# Patient Record
Sex: MALE | Race: WHITE | NOT HISPANIC OR LATINO | URBAN - METROPOLITAN AREA
[De-identification: names, ages, dates, MRNs, and addresses within clinical notes are randomized per-mention and may not be internally consistent; named-entity substitution may affect disease eponyms.]

---

## 2017-01-27 ENCOUNTER — EMERGENCY (EMERGENCY)
Facility: HOSPITAL | Age: 37
LOS: 0 days | Discharge: HOME | End: 2017-01-27

## 2017-06-27 DIAGNOSIS — Z72.0 TOBACCO USE: ICD-10-CM

## 2017-06-27 DIAGNOSIS — Z65.8 OTHER SPECIFIED PROBLEMS RELATED TO PSYCHOSOCIAL CIRCUMSTANCES: ICD-10-CM

## 2017-06-27 DIAGNOSIS — K02.9 DENTAL CARIES, UNSPECIFIED: ICD-10-CM

## 2017-06-27 DIAGNOSIS — K08.89 OTHER SPECIFIED DISORDERS OF TEETH AND SUPPORTING STRUCTURES: ICD-10-CM

## 2017-09-20 ENCOUNTER — EMERGENCY (EMERGENCY)
Facility: HOSPITAL | Age: 37
LOS: 0 days | Discharge: HOME | End: 2017-09-20

## 2017-09-20 DIAGNOSIS — L60.0 INGROWING NAIL: ICD-10-CM

## 2017-09-20 DIAGNOSIS — M79.674 PAIN IN RIGHT TOE(S): ICD-10-CM

## 2019-12-24 ENCOUNTER — INPATIENT (INPATIENT)
Facility: HOSPITAL | Age: 39
LOS: 3 days | Discharge: HOME | End: 2019-12-28
Attending: INTERNAL MEDICINE | Admitting: INTERNAL MEDICINE
Payer: COMMERCIAL

## 2019-12-24 VITALS
RESPIRATION RATE: 19 BRPM | HEART RATE: 121 BPM | DIASTOLIC BLOOD PRESSURE: 77 MMHG | TEMPERATURE: 105 F | SYSTOLIC BLOOD PRESSURE: 168 MMHG | OXYGEN SATURATION: 98 % | WEIGHT: 235.01 LBS

## 2019-12-24 LAB
ALBUMIN SERPL ELPH-MCNC: 4.4 G/DL — SIGNIFICANT CHANGE UP (ref 3.5–5.2)
ALP SERPL-CCNC: 70 U/L — SIGNIFICANT CHANGE UP (ref 30–115)
ALT FLD-CCNC: 42 U/L — HIGH (ref 0–41)
ANION GAP SERPL CALC-SCNC: 17 MMOL/L — HIGH (ref 7–14)
ANISOCYTOSIS BLD QL: SLIGHT — SIGNIFICANT CHANGE UP
APPEARANCE UR: CLEAR — SIGNIFICANT CHANGE UP
APTT BLD: 28.3 SEC — SIGNIFICANT CHANGE UP (ref 27–39.2)
AST SERPL-CCNC: 28 U/L — SIGNIFICANT CHANGE UP (ref 0–41)
BACTERIA # UR AUTO: ABNORMAL
BASE EXCESS BLDV CALC-SCNC: -1.9 MMOL/L — SIGNIFICANT CHANGE UP (ref -2–2)
BASOPHILS # BLD AUTO: 0 K/UL — SIGNIFICANT CHANGE UP (ref 0–0.2)
BASOPHILS NFR BLD AUTO: 0 % — SIGNIFICANT CHANGE UP (ref 0–1)
BILIRUB SERPL-MCNC: 2.4 MG/DL — HIGH (ref 0.2–1.2)
BILIRUB UR-MCNC: NEGATIVE — SIGNIFICANT CHANGE UP
BUN SERPL-MCNC: 17 MG/DL — SIGNIFICANT CHANGE UP (ref 10–20)
CA-I SERPL-SCNC: 1.2 MMOL/L — SIGNIFICANT CHANGE UP (ref 1.12–1.3)
CALCIUM SERPL-MCNC: 9.2 MG/DL — SIGNIFICANT CHANGE UP (ref 8.5–10.1)
CHLORIDE SERPL-SCNC: 102 MMOL/L — SIGNIFICANT CHANGE UP (ref 98–110)
CO2 SERPL-SCNC: 17 MMOL/L — SIGNIFICANT CHANGE UP (ref 17–32)
COLOR SPEC: YELLOW — SIGNIFICANT CHANGE UP
CREAT SERPL-MCNC: 1.1 MG/DL — SIGNIFICANT CHANGE UP (ref 0.7–1.5)
DIFF PNL FLD: ABNORMAL
EOSINOPHIL # BLD AUTO: 0.04 K/UL — SIGNIFICANT CHANGE UP (ref 0–0.7)
EOSINOPHIL NFR BLD AUTO: 1.7 % — SIGNIFICANT CHANGE UP (ref 0–8)
EPI CELLS # UR: 0 /HPF — SIGNIFICANT CHANGE UP (ref 0–5)
GAS PNL BLDV: 137 MMOL/L — SIGNIFICANT CHANGE UP (ref 136–145)
GAS PNL BLDV: SIGNIFICANT CHANGE UP
GIANT PLATELETS BLD QL SMEAR: PRESENT — SIGNIFICANT CHANGE UP
GLUCOSE SERPL-MCNC: 108 MG/DL — HIGH (ref 70–99)
GLUCOSE UR QL: NEGATIVE — SIGNIFICANT CHANGE UP
HCO3 BLDV-SCNC: 22 MMOL/L — SIGNIFICANT CHANGE UP (ref 22–29)
HCT VFR BLD CALC: 41.8 % — LOW (ref 42–52)
HCT VFR BLDA CALC: 42.8 % — SIGNIFICANT CHANGE UP (ref 34–44)
HGB BLD CALC-MCNC: 14 G/DL — SIGNIFICANT CHANGE UP (ref 14–18)
HGB BLD-MCNC: 13.9 G/DL — LOW (ref 14–18)
HYALINE CASTS # UR AUTO: 3 /LPF — SIGNIFICANT CHANGE UP (ref 0–7)
INR BLD: 1.47 RATIO — HIGH (ref 0.65–1.3)
KETONES UR-MCNC: NEGATIVE — SIGNIFICANT CHANGE UP
LACTATE BLDV-MCNC: 1.5 MMOL/L — SIGNIFICANT CHANGE UP (ref 0.5–1.6)
LACTATE SERPL-SCNC: 2.3 MMOL/L — HIGH (ref 0.7–2)
LEUKOCYTE ESTERASE UR-ACNC: ABNORMAL
LYMPHOCYTES # BLD AUTO: 0.3 K/UL — LOW (ref 1.2–3.4)
LYMPHOCYTES # BLD AUTO: 11.3 % — LOW (ref 20.5–51.1)
MANUAL SMEAR VERIFICATION: SIGNIFICANT CHANGE UP
MCHC RBC-ENTMCNC: 28.8 PG — SIGNIFICANT CHANGE UP (ref 27–31)
MCHC RBC-ENTMCNC: 33.3 G/DL — SIGNIFICANT CHANGE UP (ref 32–37)
MCV RBC AUTO: 86.5 FL — SIGNIFICANT CHANGE UP (ref 80–94)
METAMYELOCYTES # FLD: 0.9 % — HIGH (ref 0–0)
MICROCYTES BLD QL: SLIGHT — SIGNIFICANT CHANGE UP
MONOCYTES # BLD AUTO: 0.02 K/UL — LOW (ref 0.1–0.6)
MONOCYTES NFR BLD AUTO: 0.8 % — LOW (ref 1.7–9.3)
MYELOCYTES NFR BLD: 0.9 % — HIGH (ref 0–0)
NEUTROPHILS # BLD AUTO: 1.98 K/UL — SIGNIFICANT CHANGE UP (ref 1.4–6.5)
NEUTROPHILS NFR BLD AUTO: 72.2 % — SIGNIFICANT CHANGE UP (ref 42.2–75.2)
NEUTS BAND # BLD: 3.5 % — SIGNIFICANT CHANGE UP (ref 0–6)
NITRITE UR-MCNC: POSITIVE
PCO2 BLDV: 36 MMHG — LOW (ref 41–51)
PH BLDV: 7.4 — SIGNIFICANT CHANGE UP (ref 7.26–7.43)
PH UR: 8 — SIGNIFICANT CHANGE UP (ref 5–8)
PLAT MORPH BLD: NORMAL — SIGNIFICANT CHANGE UP
PLATELET # BLD AUTO: 148 K/UL — SIGNIFICANT CHANGE UP (ref 130–400)
PO2 BLDV: 46 MMHG — HIGH (ref 20–40)
POLYCHROMASIA BLD QL SMEAR: SLIGHT — SIGNIFICANT CHANGE UP
POTASSIUM BLDV-SCNC: 3.6 MMOL/L — SIGNIFICANT CHANGE UP (ref 3.3–5.6)
POTASSIUM SERPL-MCNC: 4.2 MMOL/L — SIGNIFICANT CHANGE UP (ref 3.5–5)
POTASSIUM SERPL-SCNC: 4.2 MMOL/L — SIGNIFICANT CHANGE UP (ref 3.5–5)
PROT SERPL-MCNC: 6.9 G/DL — SIGNIFICANT CHANGE UP (ref 6–8)
PROT UR-MCNC: ABNORMAL
PROTHROM AB SERPL-ACNC: 16.8 SEC — HIGH (ref 9.95–12.87)
RBC # BLD: 4.83 M/UL — SIGNIFICANT CHANGE UP (ref 4.7–6.1)
RBC # FLD: 12.2 % — SIGNIFICANT CHANGE UP (ref 11.5–14.5)
RBC BLD AUTO: ABNORMAL
RBC CASTS # UR COMP ASSIST: 26 /HPF — HIGH (ref 0–4)
SAO2 % BLDV: 84 % — SIGNIFICANT CHANGE UP
SODIUM SERPL-SCNC: 136 MMOL/L — SIGNIFICANT CHANGE UP (ref 135–146)
SP GR SPEC: 1.02 — SIGNIFICANT CHANGE UP (ref 1.01–1.02)
UROBILINOGEN FLD QL: SIGNIFICANT CHANGE UP
VARIANT LYMPHS # BLD: 8.7 % — HIGH (ref 0–5)
WBC # BLD: 2.62 K/UL — LOW (ref 4.8–10.8)
WBC # FLD AUTO: 2.62 K/UL — LOW (ref 4.8–10.8)
WBC UR QL: 26 /HPF — HIGH (ref 0–5)

## 2019-12-24 PROCEDURE — 99285 EMERGENCY DEPT VISIT HI MDM: CPT

## 2019-12-24 PROCEDURE — 74177 CT ABD & PELVIS W/CONTRAST: CPT | Mod: 26

## 2019-12-24 PROCEDURE — 93010 ELECTROCARDIOGRAM REPORT: CPT

## 2019-12-24 PROCEDURE — 71046 X-RAY EXAM CHEST 2 VIEWS: CPT | Mod: 26

## 2019-12-24 RX ORDER — ONDANSETRON 8 MG/1
4 TABLET, FILM COATED ORAL ONCE
Refills: 0 | Status: COMPLETED | OUTPATIENT
Start: 2019-12-24 | End: 2019-12-24

## 2019-12-24 RX ORDER — FAMOTIDINE 10 MG/ML
20 INJECTION INTRAVENOUS ONCE
Refills: 0 | Status: COMPLETED | OUTPATIENT
Start: 2019-12-24 | End: 2019-12-24

## 2019-12-24 RX ORDER — SODIUM CHLORIDE 9 MG/ML
1000 INJECTION INTRAMUSCULAR; INTRAVENOUS; SUBCUTANEOUS ONCE
Refills: 0 | Status: COMPLETED | OUTPATIENT
Start: 2019-12-24 | End: 2019-12-24

## 2019-12-24 RX ORDER — CEFTRIAXONE 500 MG/1
1000 INJECTION, POWDER, FOR SOLUTION INTRAMUSCULAR; INTRAVENOUS ONCE
Refills: 0 | Status: COMPLETED | OUTPATIENT
Start: 2019-12-24 | End: 2019-12-24

## 2019-12-24 RX ORDER — METRONIDAZOLE 500 MG
500 TABLET ORAL ONCE
Refills: 0 | Status: COMPLETED | OUTPATIENT
Start: 2019-12-24 | End: 2019-12-24

## 2019-12-24 RX ORDER — SODIUM CHLORIDE 9 MG/ML
3300 INJECTION INTRAMUSCULAR; INTRAVENOUS; SUBCUTANEOUS ONCE
Refills: 0 | Status: COMPLETED | OUTPATIENT
Start: 2019-12-24 | End: 2019-12-24

## 2019-12-24 RX ORDER — ACETAMINOPHEN 500 MG
975 TABLET ORAL ONCE
Refills: 0 | Status: COMPLETED | OUTPATIENT
Start: 2019-12-24 | End: 2019-12-24

## 2019-12-24 RX ADMIN — FAMOTIDINE 20 MILLIGRAM(S): 10 INJECTION INTRAVENOUS at 20:14

## 2019-12-24 RX ADMIN — SODIUM CHLORIDE 2000 MILLILITER(S): 9 INJECTION INTRAMUSCULAR; INTRAVENOUS; SUBCUTANEOUS at 23:54

## 2019-12-24 RX ADMIN — Medication 100 MILLIGRAM(S): at 23:37

## 2019-12-24 RX ADMIN — CEFTRIAXONE 100 MILLIGRAM(S): 500 INJECTION, POWDER, FOR SOLUTION INTRAMUSCULAR; INTRAVENOUS at 21:01

## 2019-12-24 RX ADMIN — Medication 975 MILLIGRAM(S): at 18:51

## 2019-12-24 RX ADMIN — ONDANSETRON 4 MILLIGRAM(S): 8 TABLET, FILM COATED ORAL at 20:13

## 2019-12-24 RX ADMIN — SODIUM CHLORIDE 3300 MILLILITER(S): 9 INJECTION INTRAMUSCULAR; INTRAVENOUS; SUBCUTANEOUS at 20:14

## 2019-12-24 NOTE — ED PROVIDER NOTE - OBJECTIVE STATEMENT
40 y/o M without PMH presents with fever tmax 104F, n/v/d x yesterday. no pain. 3 episodes of non-bloody non-bilious vomiting, and approximately 20 episodes of non-bloody diarrhea in last 24 hrs. no palliating/provoking factors, but thinks he ate bad chicken prior to onset. no sick contacts. [place] and has heard that there may have been some people vomiting there, but is not sure.   also relates some dysuria since last night without hematuria/frequency/urgency/flank pain/testicular pain.   no pshx.

## 2019-12-24 NOTE — ED PROVIDER NOTE - NS ED ROS FT
Review of Systems    Constitutional: (+) fever   Eyes/ENT: (-) vision changes   Cardiovascular: (-) chest pain, (-) syncope (-) palpitations  Respiratory: (-) cough, (-) shortness of breath  Gastrointestinal: (+) vomiting, (+) diarrhea (-)black/bloody stools (-) abdominal pain  Genitourinary:  (+) dysuria   Musculoskeletal: (-) neck pain, (-) back pain, (-) leg pain/swelling  Integumentary: (-) rash, (-) edema  Neurological: (-) headache, (-) confusion  Hematologic: (-) easy bruising   Allergic/Immunologic: (-) pruritus

## 2019-12-24 NOTE — ED PROVIDER NOTE - CLINICAL SUMMARY MEDICAL DECISION MAKING FREE TEXT BOX
Patient remained hemodynamically stable in ED, improved well, labs/CT findings noted, IVF/IV abx given, is admitted to Medicine for further care.

## 2019-12-24 NOTE — ED PROVIDER NOTE - PHYSICAL EXAMINATION
PHYSICAL EXAM:    GENERAL: Alert, appears stated age, well appearing, non-toxic  SKIN: Warm, pink and dry. MM somewhat dry. +feels warm  EYE: Normal lids/conjunctiva  ENT: Normal hearing, patent oropharynx  NECK: +supple. No meningismus, or JVD  Pulm: Bilateral BS, normal resp effort, no wheezes, stridor, or retractions  CV: RRR, no M/R/G, 2+and = radial pulses  Abd: soft, non-tender, non-distended. no rebound/guarding. no CVA tenderness.   Mskel: no erythema, cyanosis, edema. no calf tenderness  Neuro: AAOx3, normal gait.

## 2019-12-24 NOTE — ED PROVIDER NOTE - ATTENDING CONTRIBUTION TO CARE
Patient is c/o one day h/o fever, chills, diarrhea, dysuria, +nausea, vomited x 2, denies nasal congestion, no cough, no rash, +lower abd cramping, no recent abx use. NO rash.   vitals noted  lungs: CTA, no crackles  abd: +BS, +mild lower abd tenderness, ND, soft  CNS: awake, alert, o x 3, no meningeal signs noted  skin: no rash  A/P: Fever/diarrhea/vomiting  dysuria  labs, IVF, tylenol, abx  CT, reevaluation

## 2019-12-25 LAB
ALBUMIN SERPL ELPH-MCNC: 3.6 G/DL — SIGNIFICANT CHANGE UP (ref 3.5–5.2)
ALP SERPL-CCNC: 60 U/L — SIGNIFICANT CHANGE UP (ref 30–115)
ALT FLD-CCNC: 37 U/L — SIGNIFICANT CHANGE UP (ref 0–41)
ANION GAP SERPL CALC-SCNC: 13 MMOL/L — SIGNIFICANT CHANGE UP (ref 7–14)
AST SERPL-CCNC: 25 U/L — SIGNIFICANT CHANGE UP (ref 0–41)
BILIRUB SERPL-MCNC: 2.1 MG/DL — HIGH (ref 0.2–1.2)
BUN SERPL-MCNC: 12 MG/DL — SIGNIFICANT CHANGE UP (ref 10–20)
CALCIUM SERPL-MCNC: 8.1 MG/DL — LOW (ref 8.5–10.1)
CHLORIDE SERPL-SCNC: 107 MMOL/L — SIGNIFICANT CHANGE UP (ref 98–110)
CO2 SERPL-SCNC: 20 MMOL/L — SIGNIFICANT CHANGE UP (ref 17–32)
CREAT SERPL-MCNC: 1 MG/DL — SIGNIFICANT CHANGE UP (ref 0.7–1.5)
CULTURE RESULTS: SIGNIFICANT CHANGE UP
E COLI DNA BLD POS QL NAA+NON-PROBE: SIGNIFICANT CHANGE UP
GLUCOSE BLDC GLUCOMTR-MCNC: 77 MG/DL — SIGNIFICANT CHANGE UP (ref 70–99)
GLUCOSE SERPL-MCNC: 129 MG/DL — HIGH (ref 70–99)
GRAM STN FLD: SIGNIFICANT CHANGE UP
HCT VFR BLD CALC: 38.3 % — LOW (ref 42–52)
HGB BLD-MCNC: 12.3 G/DL — LOW (ref 14–18)
LACTATE SERPL-SCNC: 1.6 MMOL/L — SIGNIFICANT CHANGE UP (ref 0.7–2)
MAGNESIUM SERPL-MCNC: 1.4 MG/DL — LOW (ref 1.8–2.4)
MCHC RBC-ENTMCNC: 28.7 PG — SIGNIFICANT CHANGE UP (ref 27–31)
MCHC RBC-ENTMCNC: 32.1 G/DL — SIGNIFICANT CHANGE UP (ref 32–37)
MCV RBC AUTO: 89.3 FL — SIGNIFICANT CHANGE UP (ref 80–94)
METHOD TYPE: SIGNIFICANT CHANGE UP
NRBC # BLD: 0 /100 WBCS — SIGNIFICANT CHANGE UP (ref 0–0)
PLATELET # BLD AUTO: 133 K/UL — SIGNIFICANT CHANGE UP (ref 130–400)
POTASSIUM SERPL-MCNC: 4.5 MMOL/L — SIGNIFICANT CHANGE UP (ref 3.5–5)
POTASSIUM SERPL-SCNC: 4.5 MMOL/L — SIGNIFICANT CHANGE UP (ref 3.5–5)
PROT SERPL-MCNC: 5.7 G/DL — LOW (ref 6–8)
RBC # BLD: 4.29 M/UL — LOW (ref 4.7–6.1)
RBC # FLD: 12.8 % — SIGNIFICANT CHANGE UP (ref 11.5–14.5)
SODIUM SERPL-SCNC: 140 MMOL/L — SIGNIFICANT CHANGE UP (ref 135–146)
SPECIMEN SOURCE: SIGNIFICANT CHANGE UP
WBC # BLD: 12.59 K/UL — HIGH (ref 4.8–10.8)
WBC # FLD AUTO: 12.59 K/UL — HIGH (ref 4.8–10.8)

## 2019-12-25 PROCEDURE — 99223 1ST HOSP IP/OBS HIGH 75: CPT

## 2019-12-25 RX ORDER — ENOXAPARIN SODIUM 100 MG/ML
40 INJECTION SUBCUTANEOUS DAILY
Refills: 0 | Status: DISCONTINUED | OUTPATIENT
Start: 2019-12-25 | End: 2019-12-28

## 2019-12-25 RX ORDER — MAGNESIUM SULFATE 500 MG/ML
2 VIAL (ML) INJECTION ONCE
Refills: 0 | Status: COMPLETED | OUTPATIENT
Start: 2019-12-25 | End: 2019-12-25

## 2019-12-25 RX ORDER — METRONIDAZOLE 500 MG
500 TABLET ORAL EVERY 8 HOURS
Refills: 0 | Status: DISCONTINUED | OUTPATIENT
Start: 2019-12-25 | End: 2019-12-25

## 2019-12-25 RX ORDER — INFLUENZA VIRUS VACCINE 15; 15; 15; 15 UG/.5ML; UG/.5ML; UG/.5ML; UG/.5ML
0.5 SUSPENSION INTRAMUSCULAR ONCE
Refills: 0 | Status: DISCONTINUED | OUTPATIENT
Start: 2019-12-25 | End: 2019-12-28

## 2019-12-25 RX ORDER — SODIUM CHLORIDE 9 MG/ML
1000 INJECTION INTRAMUSCULAR; INTRAVENOUS; SUBCUTANEOUS
Refills: 0 | Status: DISCONTINUED | OUTPATIENT
Start: 2019-12-25 | End: 2019-12-27

## 2019-12-25 RX ORDER — CEFTRIAXONE 500 MG/1
2000 INJECTION, POWDER, FOR SOLUTION INTRAMUSCULAR; INTRAVENOUS EVERY 24 HOURS
Refills: 0 | Status: DISCONTINUED | OUTPATIENT
Start: 2019-12-26 | End: 2019-12-28

## 2019-12-25 RX ORDER — CIPROFLOXACIN LACTATE 400MG/40ML
200 VIAL (ML) INTRAVENOUS EVERY 12 HOURS
Refills: 0 | Status: DISCONTINUED | OUTPATIENT
Start: 2019-12-25 | End: 2019-12-25

## 2019-12-25 RX ORDER — CEFTRIAXONE 500 MG/1
INJECTION, POWDER, FOR SOLUTION INTRAMUSCULAR; INTRAVENOUS
Refills: 0 | Status: DISCONTINUED | OUTPATIENT
Start: 2019-12-25 | End: 2019-12-28

## 2019-12-25 RX ORDER — CEFTRIAXONE 500 MG/1
2000 INJECTION, POWDER, FOR SOLUTION INTRAMUSCULAR; INTRAVENOUS ONCE
Refills: 0 | Status: COMPLETED | OUTPATIENT
Start: 2019-12-25 | End: 2019-12-25

## 2019-12-25 RX ADMIN — Medication 100 MILLIGRAM(S): at 05:23

## 2019-12-25 RX ADMIN — CEFTRIAXONE 100 MILLIGRAM(S): 500 INJECTION, POWDER, FOR SOLUTION INTRAMUSCULAR; INTRAVENOUS at 14:43

## 2019-12-25 RX ADMIN — Medication 12.5 GRAM(S): at 21:50

## 2019-12-25 NOTE — H&P ADULT - HISTORY OF PRESENT ILLNESS
38 y/o M without PMH presents with fever tmax 104F, n/v/d x yesterday. no pain. 3 episodes of non-bloody non-bilious vomiting, and approximately 20 episodes of non-bloody diarrhea in last 24 hrs. no palliating/provoking factors, but thinks he ate bad chicken prior to onset. no sick contacts. [place] and has heard that there may have been some people vomiting there, but is not sure.   	also relates some dysuria since last night without hematuria/frequency/urgency/flank pain/testicular pain. Patient is a 38 y/o M with no PMH presented to the hospital with chief complaint of fever, diarrhea since yesterday. Patient travelled to a resort in [place] with his family and came back yesterday. After returning, he started having multiple episodes of nonbloody watery diarrhea, about 20 episodes, with no associated abdominal pain. Patient also reports having subjective fevers at home, 3 episodes of nonbloody, nonbilious vomiting. Family also had food at the same resort but none of them are sick. He also reports having burning on urination since yesterday with no increased frequency, flank tenderness, hematuria.   In ED, /77mm Hg, , 104.6F. Labs on admission showed WBC 2.62, lactate 2.3. EKG showed sinus tachycardia. UA positive. CT abdomen showed no intraabdominal pathology. Diarrhea currently resolved.

## 2019-12-25 NOTE — H&P ADULT - ASSESSMENT
Patient is a 40 y/o M with no PMH presented to the hospital with 1 day history of fever, multiple episodes of nonbloody diarrhea, 3 episodes of non bloody vomiting. Patient travelled to a resort in [place] with his family and came back yesterday. In ED, vitals showed , fever of 104.6F. Labs on admission showed WBC 2.62, lactate 2.3. EKG showed sinus tachycardia. UA positive. CT abdomen showed no intraabdominal pathology. Diarrhea currently resolved.     ASSESSMENT AND PLAN:    #Sepsis 2/2 viral gastroenteritis vs UTI  -Multiple episodes of diarrhea, 3 episodes of vomiting, fever, dysuria  -WBC 2.62, lactate 2.3, tachycardic to 121, fever 104.6F on admission  -Currently diarrhea and vomiting resolved, afebrile  -s/p 3l LR bolus. Started on NS @75cc/hr  -UA positive  -CT abdomen & pelvis showed no acute intra abdominal pathology  -Started on cipro and flagyl   -f/u urine and blood culture results, repeat lactate in am    #Diet: Regular  #DVT ppx: lovenox  #GI ppx: not indicated  #Dispo: acute Patient is a 40 y/o M with no PMH presented to the hospital with 1 day history of fever, multiple episodes of nonbloody diarrhea, 3 episodes of non bloody vomiting. Patient travelled to a resort in [place] with his family and came back yesterday. In ED, vitals showed , fever of 104.6F. Labs on admission showed WBC 2.62, lactate 2.3. EKG showed sinus tachycardia. UA positive. CT abdomen showed no intraabdominal pathology. Diarrhea currently resolved.     ASSESSMENT AND PLAN:    #Sepsis 2/2 viral gastroenteritis vs UTI  -Multiple episodes of diarrhea, 3 episodes of vomiting, fever, dysuria  -WBC 2.62, lactate 2.3, tachycardic to 121, fever 104.6F on admission  -Currently diarrhea and vomiting resolved, afebrile  -s/p 3l LR bolus. Started on NS @75cc/hr  -UA positive  -CT abdomen & pelvis showed no acute intra abdominal pathology  - Given cipro and flagyl in the ER.  -f/u urine and blood culture results.    #Diet: Regular  #DVT ppx: lovenox  #GI ppx: not indicated  #Dispo: acute

## 2019-12-25 NOTE — H&P ADULT - ATTENDING COMMENTS
The patient was seen and examined independently.   Agree with above a/p by resident except the portion edited/corrected.     The patient found to be bacteremic with GNR.  started on IV abx Ceftriaxone.  Will f/w blood cx / urine cx.    Plan d/w the patient in details at bedside.  Will follow.

## 2019-12-26 LAB
ALBUMIN SERPL ELPH-MCNC: 3.6 G/DL — SIGNIFICANT CHANGE UP (ref 3.5–5.2)
ALP SERPL-CCNC: 60 U/L — SIGNIFICANT CHANGE UP (ref 30–115)
ALT FLD-CCNC: 31 U/L — SIGNIFICANT CHANGE UP (ref 0–41)
ANION GAP SERPL CALC-SCNC: 14 MMOL/L — SIGNIFICANT CHANGE UP (ref 7–14)
AST SERPL-CCNC: 18 U/L — SIGNIFICANT CHANGE UP (ref 0–41)
BASOPHILS # BLD AUTO: 0.05 K/UL — SIGNIFICANT CHANGE UP (ref 0–0.2)
BASOPHILS NFR BLD AUTO: 0.5 % — SIGNIFICANT CHANGE UP (ref 0–1)
BILIRUB SERPL-MCNC: 0.6 MG/DL — SIGNIFICANT CHANGE UP (ref 0.2–1.2)
BUN SERPL-MCNC: 11 MG/DL — SIGNIFICANT CHANGE UP (ref 10–20)
CALCIUM SERPL-MCNC: 8.5 MG/DL — SIGNIFICANT CHANGE UP (ref 8.5–10.1)
CHLORIDE SERPL-SCNC: 105 MMOL/L — SIGNIFICANT CHANGE UP (ref 98–110)
CO2 SERPL-SCNC: 21 MMOL/L — SIGNIFICANT CHANGE UP (ref 17–32)
CREAT SERPL-MCNC: 1 MG/DL — SIGNIFICANT CHANGE UP (ref 0.7–1.5)
EOSINOPHIL # BLD AUTO: 0.2 K/UL — SIGNIFICANT CHANGE UP (ref 0–0.7)
EOSINOPHIL NFR BLD AUTO: 1.8 % — SIGNIFICANT CHANGE UP (ref 0–8)
GLUCOSE SERPL-MCNC: 103 MG/DL — HIGH (ref 70–99)
GRAM STN FLD: SIGNIFICANT CHANGE UP
HCT VFR BLD CALC: 38.4 % — LOW (ref 42–52)
HGB BLD-MCNC: 12.3 G/DL — LOW (ref 14–18)
IMM GRANULOCYTES NFR BLD AUTO: 0.5 % — HIGH (ref 0.1–0.3)
LYMPHOCYTES # BLD AUTO: 1.64 K/UL — SIGNIFICANT CHANGE UP (ref 1.2–3.4)
LYMPHOCYTES # BLD AUTO: 14.8 % — LOW (ref 20.5–51.1)
MAGNESIUM SERPL-MCNC: 2.4 MG/DL — SIGNIFICANT CHANGE UP (ref 1.8–2.4)
MCHC RBC-ENTMCNC: 28.7 PG — SIGNIFICANT CHANGE UP (ref 27–31)
MCHC RBC-ENTMCNC: 32 G/DL — SIGNIFICANT CHANGE UP (ref 32–37)
MCV RBC AUTO: 89.7 FL — SIGNIFICANT CHANGE UP (ref 80–94)
MONOCYTES # BLD AUTO: 0.67 K/UL — HIGH (ref 0.1–0.6)
MONOCYTES NFR BLD AUTO: 6 % — SIGNIFICANT CHANGE UP (ref 1.7–9.3)
NEUTROPHILS # BLD AUTO: 8.46 K/UL — HIGH (ref 1.4–6.5)
NEUTROPHILS NFR BLD AUTO: 76.4 % — HIGH (ref 42.2–75.2)
NRBC # BLD: 0 /100 WBCS — SIGNIFICANT CHANGE UP (ref 0–0)
PLATELET # BLD AUTO: 137 K/UL — SIGNIFICANT CHANGE UP (ref 130–400)
POTASSIUM SERPL-MCNC: 4.2 MMOL/L — SIGNIFICANT CHANGE UP (ref 3.5–5)
POTASSIUM SERPL-SCNC: 4.2 MMOL/L — SIGNIFICANT CHANGE UP (ref 3.5–5)
PROT SERPL-MCNC: 6.2 G/DL — SIGNIFICANT CHANGE UP (ref 6–8)
RBC # BLD: 4.28 M/UL — LOW (ref 4.7–6.1)
RBC # FLD: 13 % — SIGNIFICANT CHANGE UP (ref 11.5–14.5)
SODIUM SERPL-SCNC: 140 MMOL/L — SIGNIFICANT CHANGE UP (ref 135–146)
WBC # BLD: 11.08 K/UL — HIGH (ref 4.8–10.8)
WBC # FLD AUTO: 11.08 K/UL — HIGH (ref 4.8–10.8)

## 2019-12-26 PROCEDURE — 99233 SBSQ HOSP IP/OBS HIGH 50: CPT

## 2019-12-26 RX ORDER — CHLORHEXIDINE GLUCONATE 213 G/1000ML
1 SOLUTION TOPICAL
Refills: 0 | Status: DISCONTINUED | OUTPATIENT
Start: 2019-12-26 | End: 2019-12-28

## 2019-12-26 RX ADMIN — CEFTRIAXONE 100 MILLIGRAM(S): 500 INJECTION, POWDER, FOR SOLUTION INTRAMUSCULAR; INTRAVENOUS at 12:23

## 2019-12-26 RX ADMIN — ENOXAPARIN SODIUM 40 MILLIGRAM(S): 100 INJECTION SUBCUTANEOUS at 12:23

## 2019-12-26 NOTE — PROGRESS NOTE ADULT - ASSESSMENT
Patient is a 38 y/o M with no PMH presented to the hospital with 1 day history of fever, multiple episodes of nonbloody diarrhea, 3 episodes of non bloody vomiting. Patient travelled to a resort in [place] with his family and came back yesterday. In ED, vitals showed , fever of 104.6F. Labs on admission showed WBC 2.62, lactate 2.3. EKG showed sinus tachycardia. UA positive. CT abdomen showed no intraabdominal pathology.     ASSESSMENT AND PLAN:    #Sepsis with gram negative luz bacteremia   - Multiple episodes of diarrhea, 3 episodes of vomiting, fever, dysuria  - WBC 2.62, lactate 2.3, tachycardic to 121, fever 104.6F on admission  - Currently diarrhea has improved and vomiting resolved, afebrile  - s/p 3l LR bolus. Continuing NS @75cc/hr  - UA positive  - Urine culture revealed normal gracie  -CT abdomen & pelvis showed no acute intra abdominal pathology  - Given cipro and flagyl in the ER.  - Currently on Ceftriaxone 2000mg IV   -Blood cx- 2/2 growing gram negative rods in aerobic bottles, one growing E Coli    Possible gastroenteritis  -Stool PCR detected EPEC    #Diet: Regular  #DVT ppx: lovenox  #GI ppx: not indicated  #Dispo: acute    #Progress Note Handoff  Pending (specify): Final  Blood cx / Stool cx   Family discussion: NA, d/w the patient at bedside.   Disposition: Home.

## 2019-12-26 NOTE — CONSULT NOTE ADULT - SUBJECTIVE AND OBJECTIVE BOX
HPI:     Patient is a 40 y/o M with no PMH presented to the hospital with chief complaint of fevers, diarrhea since yesterday. Patient travelled to a resort in [place] with his family and came back yesterday. The he started having multiple episodes of nonbloody watery diarrhea,  fevers at home, onbloody, nonbilious vomiting and chills.     In ED, /77mm Hg, , 104.6F.     No Known Allergies      Awake, alert, s1s2, abd soft, non tender, lungs clear,   no edema  Pt saying BM getting formed    cefTRIAXone   IVPB      cefTRIAXone   IVPB 2000 milliGRAM(s) IV Intermittent every 24 hours  chlorhexidine 4% Liquid 1 Application(s) Topical <User Schedule>  enoxaparin Injectable 40 milliGRAM(s) SubCutaneous daily  influenza   Vaccine 0.5 milliLiter(s) IntraMuscular once  sodium chloride 0.9%. 1000 milliLiter(s) IV Continuous <Continuous>    cefTRIAXone   IVPB      cefTRIAXone   IVPB 2000 milliGRAM(s) IV Intermittent every 24 hours      T(C): 37.3 (19 @ 13:30), Max: 37.4 (19 @ 21:32)  HR: 84 (19 @ 13:30) (84 - 95)  BP: 116/66 (19 @ 13:30) (104/50 - 130/78)  RR: 18 (19 @ 13:30) (18 - 18)  SpO2: 96% (19 @ 09:25) (96% - 96%)                            12.3   11.08 )-----------( 137      ( 26 Dec 2019 06:44 )             38.4           140  |  105  |  11  ----------------------------<  103<H>  4.2   |  21  |  1.0    Ca    8.5      26 Dec 2019 06:44  Mg     2.4         TPro  6.2  /  Alb  3.6  /  TBili  0.6  /  DBili  x   /  AST  18  /  ALT  31  /  AlkPhos  60            Urinalysis Basic - ( 24 Dec 2019 20:44 )    Color: Yellow / Appearance: Clear / S.021 / pH: x  Gluc: x / Ketone: Negative  / Bili: Negative / Urobili: <2 mg/dL   Blood: x / Protein: 30 mg/dL / Nitrite: Positive   Leuk Esterase: Moderate / RBC: 26 /HPF / WBC 26 /HPF   Sq Epi: x / Non Sq Epi: 0 /HPF / Bacteria: Many    GI PCR Panel, Stool (19 @ 12:21)    Specimen Source: .Stool Feces    Culture Results:   Enteropathogenic E. coli (EPEC)  DETECTED by PCR  *******Please Note:*******  GI panel PCR evaluates for:  Campylobacter, Plesiomonas shigelloides, Salmonella,  Vibrio, Yersinia enterocolitica, Enteroaggregative  Escherichia coli (EAEC), Enteropathogenic E.coli (EPEC),  Enterotoxigenic E. coli (ETEC) lt/st, Shiga-like  toxin-producing E. coli (STEC) stx1/stx2,  Shigella/ Enteroinvasive E. coli (EIEC), Cryptosporidium,  Cyclospora cayetanensis, Entamoeba histolytica,  Giardia lamblia, AdenovirusF 40/41, Astrovirus,  Norovirus GI/GII, Rotavirus A, Sapovirus    Culture - Urine (19 @ 00:20)    Specimen Source: .Urine None    Culture Results:   <10,000 CFU/mL Normal Urogenital Jennifer    Culture - Blood (19 @ 19:29)    Gram Stain:   Growth in aerobic bottle: Gram Negative Rods  Growth in anaerobic bottle: Gram Negative Rods    Specimen Source: .Blood Blood-Peripheral    Culture Results:   Growth in aerobic bottle: Escherichia coli See previous culture  [id number]  Growth in anaerobic bottle: Gram Negative Rods    Culture - Blood (19 @ 19:29)    -  Escherichia coli: Detec    Gram Stain:   Growth in aerobic bottle: Gram Negative Rods  Growth in anaerobic bottle: Gram Negative Rods    Specimen Source: .Blood Blood-Peripheral    Organism: Blood Culture PCR    Culture Results:   Growth in aerobic and anaerobic bottles: Escherichia coli  "Due to technical problems, Proteus sp. will Not be reported as part of  the BCID panel until further notice"  ***Blood Panel PCR results on this specimen are available  approximately 3 hours after the Gram stain result.***  Gram stain, PCR, and/or culture results may not always  correspond due to difference in methodologies.  ************************************************************  This PCR assay was performed using [employer].  The following targets are tested for: Enterococcus,  vancomycin resistant enterococci, Listeria monocytogenes,  coagulase negative staphylococci, S. aureus,  methicillin resistant S. aureus, Streptococcus agalactiae  (Group B), S. pneumoniae, S.pyogenes (Group A),  Acinetobacter baumannii, Enterobacter cloacae, E. coli,  Klebsiella oxytoca, K. pneumoniae, Proteus sp.,  Serratia marcescens, Haemophilus influenzae,  Neisseria meningitidis, Pseudomonas aeruginosa, Candida  albicans, C. glabrata, C krusei, C parapsilosis,  C. tropicalis and the KPC resistance gene.    Organism Identification: Blood Culture PCR    Method Type: PCR    EXAM:  CT ABDOMEN AND PELVIS IC            PROCEDURE DATE:  2019            INTERPRETATION:  CLINICAL STATEMENT: Diarrhea, fever.      TECHNIQUE: Contiguous axial CT images were obtained from the lower chest to the pubic symphysis following administration of 100cc Optiray 320 intravenous contrast.  Oral contrast was not administered.  Reformatted images in the coronal and sagittal planes were acquired.    COMPARISON CT: None.    FINDINGS:    LOWER CHEST: Unremarkable.    HEPATOBILIARY: Unremarkable.    SPLEEN: Unremarkable.    PANCREAS: Unremarkable.    ADRENAL GLANDS: Unremarkable.    KIDNEYS: Symmetric nephrograms. No hydronephrosis.     ABDOMINOPELVIC NODES: No abdominopelvic lymphadenopathy.    PELVIC ORGANS: Mildly enlarged prostate gland.    PERITONEUM/MESENTERY/BOWEL: No evidence of bowel obstruction, pneumoperitoneum, or ascites. Normal caliber appendix.    BONES/SOFT TISSUES: No acute osseous abnormality.     OTHER: Normal caliber aorta.      IMPRESSION:     No CT evidence for acute intra-abdominal or pelvic pathology

## 2019-12-26 NOTE — PROGRESS NOTE ADULT - ASSESSMENT
Patient is a 38 y/o M with no PMH presented to the hospital with 1 day history of fever, multiple episodes of nonbloody diarrhea, 3 episodes of non bloody vomiting. Patient travelled to a resort in [place] with his family and came back yesterday. In ED, vitals showed , fever of 104.6F. Labs on admission showed WBC 2.62, lactate 2.3. EKG showed sinus tachycardia. UA positive. CT abdomen showed no intraabdominal pathology. Diarrhea currently resolved.     ASSESSMENT AND PLAN:    #Sepsis with gram negative luz bacteremia 2/2 UTI  -Multiple episodes of diarrhea, 3 episodes of vomiting, fever, dysuria  -WBC 2.62, lactate 2.3, tachycardic to 121, fever 104.6F on admission  -Currently diarrhea and vomiting resolved, afebrile  -s/p 3l LR bolus. Started on NS @75cc/hr  -UA positive  -CT abdomen & pelvis showed no acute intra abdominal pathology  - Given cipro and flagyl in the ER.  -f/u urine   -blood cx- 2/2 growing gram negative rods in aerobic bottles, one growing E Coli    Possible gastroenteritis  -stool cx grow EPEC    #Diet: Regular  #DVT ppx: lovenox  #GI ppx: not indicated  #Dispo: acute Patient is a 40 y/o M with no PMH presented to the hospital with 1 day history of fever, multiple episodes of nonbloody diarrhea, 3 episodes of non bloody vomiting. Patient travelled to a resort in [place] with his family and came back yesterday. In ED, vitals showed , fever of 104.6F. Labs on admission showed WBC 2.62, lactate 2.3. EKG showed sinus tachycardia. UA positive. CT abdomen showed no intraabdominal pathology.     ASSESSMENT AND PLAN:    #Sepsis with gram negative luz bacteremia   - Multiple episodes of diarrhea, 3 episodes of vomiting, fever, dysuria  - WBC 2.62, lactate 2.3, tachycardic to 121, fever 104.6F on admission  - Currently diarrhea has improved and vomiting resolved, afebrile  - s/p 3l LR bolus. Continuing NS @75cc/hr  - UA positive  - Urine culture revealed normal gracie  -CT abdomen & pelvis showed no acute intra abdominal pathology  - Given cipro and flagyl in the ER.  - Currently on Ceftriaxone 2000mg IV   -Blood cx- 2/2 growing gram negative rods in aerobic bottles, one growing E Coli    Possible gastroenteritis  -Stool PCR detected EPEC    #Diet: Regular  #DVT ppx: lovenox  #GI ppx: not indicated  #Dispo: acute Patient is a 40 y/o M with no PMH presented to the hospital with 1 day history of fever, multiple episodes of nonbloody diarrhea, 3 episodes of non bloody vomiting. Patient travelled to a resort in [place] with his family and came back yesterday. In ED, vitals showed , fever of 104.6F. Labs on admission showed WBC 2.62, lactate 2.3. EKG showed sinus tachycardia. UA positive. CT abdomen showed no intraabdominal pathology.     ASSESSMENT AND PLAN:    #Sepsis with gram negative luz bacteremia possible 2/2 UTI  - Multiple episodes of diarrhea, 3 episodes of vomiting, fever, dysuria  - WBC 2.62, lactate 2.3, tachycardic to 121, fever 104.6F on admission  - Currently diarrhea has improved and vomiting resolved, afebrile  - s/p 3l LR bolus. Continuing NS @75cc/hr  - UA positive  - Urine culture revealed normal gracie  - CT abdomen & pelvis showed no acute intra abdominal pathology  - Given cipro and flagyl in the ER.  - Currently on Ceftriaxone 2000mg IV   - Blood cx- 2/2 growing gram negative rods in aerobic bottles, one growing E Coli    Possible gastroenteritis  -Stool PCR detected EPEC  - CT A/P negative  - on rocephin 2 g q12    #Diet: Regular  #DVT ppx: lovenox  #GI ppx: not indicated  #Dispo: acute

## 2019-12-26 NOTE — PROGRESS NOTE ADULT - SUBJECTIVE AND OBJECTIVE BOX
MADELINE KATZ  39y  Male      Patient is a 39y old  Male who presents with a chief complaint of Diarrhea, fever (26 Dec 2019 06:27)      INTERVAL HPI/OVERNIGHT EVENTS:      ******************************* REVIEW OF SYSTEMS:**********************************************  Had two episodes of diarrhea overnight. Otherwise feeling alright.   All other review of systems negative    *********************** VITALS ******************************************    T(F): 99.4 (19 @ 06:04)  HR: 86 (19 @ 06:04) (86 - 95)  BP: 104/50 (19 @ 06:04) (104/50 - 155/83)  RR: 18 (19 @ 06:04) (18 - 18)  SpO2: 96% (19 @ 09:25) (96% - 96%)            ******************************** PHYSICAL EXAM:**************************************************  GENERAL: NAD    PSYCH: no agitation, baseline mentation  HEENT:     NERVOUS SYSTEM:  Alert & Oriented X3,  PULMONARY: JOHANN, CTA    CARDIOVASCULAR: S1S2 RRR    GI: Soft, NT, ND; BS present.    EXTREMITIES:  2+ Peripheral Pulses, No clubbing, cyanosis, or edema    LYMPH: No lymphadenopathy noted    SKIN: No rashes or lesions    ******************************************************************************************      **************************** LABS *******************************************************                          12.3   11.08 )-----------( 137      ( 26 Dec 2019 06:44 )             38.4         140  |  105  |  11  ----------------------------<  103<H>  4.2   |  21  |  1.0    Ca    8.5      26 Dec 2019 06:44  Mg     2.4         TPro  6.2  /  Alb  3.6  /  TBili  0.6  /  DBili  x   /  AST  18  /  ALT  31  /  AlkPhos  60        Urinalysis Basic - ( 24 Dec 2019 20:44 )    Color: Yellow / Appearance: Clear / S.021 / pH: x  Gluc: x / Ketone: Negative  / Bili: Negative / Urobili: <2 mg/dL   Blood: x / Protein: 30 mg/dL / Nitrite: Positive   Leuk Esterase: Moderate / RBC: 26 /HPF / WBC 26 /HPF   Sq Epi: x / Non Sq Epi: 0 /HPF / Bacteria: Many      PT/INR - ( 24 Dec 2019 19:29 )   PT: 16.80 sec;   INR: 1.47 ratio         PTT - ( 24 Dec 2019 19:29 )  PTT:28.3 sec  Lactate Trend   @ 06:02 Lactate:1.6    @ 19:29 Lactate:2.3         CAPILLARY BLOOD GLUCOSE      POCT Blood Glucose.: 77 mg/dL (25 Dec 2019 17:06)          **************************Active Medications *******************************************  No Known Allergies      cefTRIAXone   IVPB      cefTRIAXone   IVPB 2000 milliGRAM(s) IV Intermittent every 24 hours  chlorhexidine 4% Liquid 1 Application(s) Topical <User Schedule>  enoxaparin Injectable 40 milliGRAM(s) SubCutaneous daily  influenza   Vaccine 0.5 milliLiter(s) IntraMuscular once  sodium chloride 0.9%. 1000 milliLiter(s) IV Continuous <Continuous>      ***************************************************  RADIOLOGY & ADDITIONAL TESTS:    Imaging Personally Reviewed:  [ ] YES  [ ] NO    HEALTH ISSUES - PROBLEM Dx:

## 2019-12-26 NOTE — PROGRESS NOTE ADULT - SUBJECTIVE AND OBJECTIVE BOX
Hospital Day:  2d    Subjective:    Patient is a 39y old  Male who presents with a chief complaint of Diarrhea, fever (25 Dec 2019 01:25)      Past Medical Hx:   No pertinent past medical history    Past Sx:  No significant past surgical history    Allergies:  No Known Allergies    Current Meds:   Standng Meds:  cefTRIAXone   IVPB      cefTRIAXone   IVPB 2000 milliGRAM(s) IV Intermittent every 24 hours  enoxaparin Injectable 40 milliGRAM(s) SubCutaneous daily  influenza   Vaccine 0.5 milliLiter(s) IntraMuscular once  sodium chloride 0.9%. 1000 milliLiter(s) (75 mL/Hr) IV Continuous <Continuous>    PRN Meds:    HOME MEDICATIONS:      Vital Signs:   T(F): 99.4 (19 @ 06:04), Max: 99.4 (19 @ 06:04)  HR: 86 (19 @ 06:04) (86 - 99)  BP: 104/50 (19 @ 06:04) (104/50 - 155/83)  RR: 18 (19 @ 06:04) (18 - 18)  SpO2: --        Physical Exam:   GENERAL: NAD  HEENT: NCAT  CHEST/LUNG: CTAB  HEART: Regular rate and rhythm; s1 s2 appreciated, No murmurs, rubs, or gallops  ABDOMEN: Soft, Nontender, Nondistended; Bowel sounds present  EXTREMITIES: No LE edema b/l  NERVOUS SYSTEM:  Alert & Oriented X3        Labs:                         12.3   12.59 )-----------( 133      ( 25 Dec 2019 06:02 )             38.3       25 Dec 2019 06:02    140    |  107    |  12     ----------------------------<  129    4.5     |  20     |  1.0      Ca    8.1        25 Dec 2019 06:02  Mg     1.4       25 Dec 2019 06:02    TPro  5.7    /  Alb  3.6    /  TBili  2.1    /  DBili  x      /  AST  25     /  ALT  37     /  AlkPhos  60     25 Dec 2019 06:02                    Urinalysis Basic - ( 24 Dec 2019 20:44 )    Color: Yellow / Appearance: Clear / S.021 / pH: x  Gluc: x / Ketone: Negative  / Bili: Negative / Urobili: <2 mg/dL   Blood: x / Protein: 30 mg/dL / Nitrite: Positive   Leuk Esterase: Moderate / RBC: 26 /HPF / WBC 26 /HPF   Sq Epi: x / Non Sq Epi: 0 /HPF / Bacteria: Many          Culture - Blood (collected 19 @ 19:29)  Source: .Blood Blood-Peripheral  Gram Stain (19 @ 01:11):    Growth in aerobic bottle: Gram Negative Rods    Growth in anaerobic bottle: Gram Negative Rods  Preliminary Report (19 @ 01:11):    Growth in aerobic bottle: Gram Negative Rods    Growth in anaerobic bottle: Gram Negative Rods    Culture - Blood (collected 19 @ 19:29)  Source: .Blood Blood-Peripheral  Gram Stain (19 @ 15:03):    Growth in aerobic bottle: Gram Negative Rods    Growth in anaerobic bottle: Gram Negative Rods  Preliminary Report (19 @ 15:03):    Growth in aerobic bottle: Gram Negative Rods    Growth in anaerobic bottle: Gram Negative Rods    "Due to technical problems, Proteus sp. will Not be reported as part of    the BCID panel until further notice"    ***Blood Panel PCR results on this specimenare available    approximately 3 hours after the Gram stain result.***    Gram stain, PCR, and/or culture results may not always    correspond due to difference in methodologies.    ************************************************************    This PCR assaywas performed using [employer].    The following targets are tested for: Enterococcus,    vancomycin resistant enterococci, Listeria monocytogenes,    coagulase negative staphylococci, S. aureus,    methicillin resistant S. aureus, Streptococcus agalactiae    (Group B), S. pneumoniae, S. pyogenes (Group A),    Acinetobacter baumannii, Enterobacter cloacae, E. coli,    Klebsiella oxytoca, K. pneumoniae, Proteus sp.,    Serratia marcescens, Haemophilus influenzae,    Neisseria meningitidis, Pseudomonas aeruginosa, Candida    albicans, C. glabrata, C krusei, C parapsilosis,    C. tropicalis and the KPC resistance gene.  Organism: Blood Culture PCR (19 @ 13:36)  Organism: Blood Culture PCR (19 @ 13:36)      -  Escherichia coli: Detec      Method Type: PCR        Radiology: Hospital Day:  2d    Subjective:    Patient is a 39y old Male who presents with a chief complaint of Diarrhea, fever (25 Dec 2019 01:25)    Patient is a 38 y/o M with no PMH presented to the hospital with chief complaint of fever, diarrhea since yesterday. Patient travelled to a resort in [place] with his family and came back yesterday. On Monday, he started having multiple episodes of nonbloody watery diarrhea. Tuesday he had about 20 episodes of nonbloody watery diarrhea, with no associated abdominal pain. Patient also reports having subjective fevers at home, 3 episodes of nonbloody, nonbilious vomiting, also with no associated pain. Family also had food at the same resort but none of them are sick. He also reports having burning on urination since Monday with no increased frequency, flank tenderness, or hematuria.    In ED, /77mm Hg, , 104.6F. Labs on admission showed WBC 2.62, lactate 2.3. EKG showed sinus tachycardia. UA positive. CT abdomen showed no intraabdominal pathology.     Patient was examined bedside this morning. He reports a couple episodes of loose, nonbloody, and watery stools overnight and one this morning. He denies any abdominal pain, vomiting, blood on urination, or burning with urination today. He reports feeling better generally.      Past Medical Hx:   No pertinent past medical history    Past Sx:  No significant past surgical history    Allergies:  No Known Allergies    Current Meds:   Standng Meds:  cefTRIAXone   IVPB      cefTRIAXone   IVPB 2000 milliGRAM(s) IV Intermittent every 24 hours  enoxaparin Injectable 40 milliGRAM(s) SubCutaneous daily  influenza   Vaccine 0.5 milliLiter(s) IntraMuscular once  sodium chloride 0.9%. 1000 milliLiter(s) (75 mL/Hr) IV Continuous <Continuous>    PRN Meds:  None  HOME MEDICATIONS:  None    Vital Signs:   T(F): 99.4 (19 @ 06:04), Max: 99.4 (19 @ 06:04)  HR: 86 (19 @ 06:04) (86 - 99)  BP: 104/50 (19 @ 06:04) (104/50 - 155/83)  RR: 18 (19 @ 06:04) (18 - 18)  SpO2: --      Physical Exam:   GENERAL: Resting comfortably in no acute distress  HEENT: NCAT  CHEST/LUNG: CTAB  HEART: Regular rate and rhythm; s1 s2 appreciated, No murmurs, rubs, or gallops  ABDOMEN: Soft, Nontender, Nondistended; Bowel sounds present  EXTREMITIES: No LE edema b/l  NERVOUS SYSTEM:  Alert & Oriented X3    LABS:                        12.3   11.08 )-----------( 137      ( 26 Dec 2019 06:44 )             38.4         140  |  105  |  11  ----------------------------<  103<H>  4.2   |  21  |  1.0    Ca    8.5      26 Dec 2019 06:44  Mg     2.4         TPro  5.7    /  Alb  3.6    /  TBili  2.1    /  DBili  x      /  AST  25     /  ALT  37     /  AlkPhos  60     25 Dec 2019 06:02    Urinalysis Basic - ( 24 Dec 2019 20:44 )    Color: Yellow / Appearance: Clear / S.021 / pH: x  Gluc: x / Ketone: Negative  / Bili: Negative / Urobili: <2 mg/dL   Blood: x / Protein: 30 mg/dL / Nitrite: Positive   Leuk Esterase: Moderate / RBC: 26 /HPF / WBC 26 /HPF   Sq Epi: x / Non Sq Epi: 0 /HPF / Bacteria: Many    Culture - Blood (collected 19 @ 19:29)  Source: .Blood Blood-Peripheral  Gram Stain (19 @ 01:11):    Growth in aerobic bottle: Gram Negative Rods    Growth in anaerobic bottle: Gram Negative Rods  Preliminary Report (19 @ 01:11):    Growth in aerobic bottle: Gram Negative Rods    Growth in anaerobic bottle: Gram Negative Rods    Culture - Blood (collected 19 @ 19:29)  Source: .Blood Blood-Peripheral  Gram Stain (19 @ 15:03):    Growth in aerobic bottle: Gram Negative Rods    Growth in anaerobic bottle: Gram Negative Rods  Preliminary Report (19 @ 15:03):    Growth in aerobic bottle: Gram Negative Rods    Growth in anaerobic bottle: Gram Negative Rods    "Due to technical problems, Proteus sp. will Not be reported as part of    the BCID panel until further notice"    ***Blood Panel PCR results on this specimenare available    approximately 3 hours after the Gram stain result.***    Gram stain, PCR, and/or culture results may not always    correspond due to difference in methodologies.    ************************************************************    This PCR assaywas performed using [employer].    The following targets are tested for: Enterococcus,    vancomycin resistant enterococci, Listeria monocytogenes,    coagulase negative staphylococci, S. aureus,    methicillin resistant S. aureus, Streptococcus agalactiae    (Group B), S. pneumoniae, S. pyogenes (Group A),    Acinetobacter baumannii, Enterobacter cloacae, E. coli,    Klebsiella oxytoca, K. pneumoniae, Proteus sp.,    Serratia marcescens, Haemophilus influenzae,    Neisseria meningitidis, Pseudomonas aeruginosa, Candida    albicans, C. glabrata, C krusei, C parapsilosis,    C. tropicalis and the KPC resistance gene.  Organism: Blood Culture PCR (19 @ 13:36)  Organism: Blood Culture PCR (19 @ 13:36)      -  Escherichia coli: Detec      Method Type: PCR Hospital Day:  2d    Subjective:    Patient is a 39y old Male who presents with a chief complaint of Diarrhea, fever (25 Dec 2019 01:25)    Patient is a 40 y/o M with no PMH presented to the hospital with chief complaint of fever, diarrhea since yesterday. Patient travelled to a resort in [place] with his family and came back yesterday. On Monday, he started having multiple episodes of nonbloody watery diarrhea. Tuesday he had about 20 episodes of nonbloody watery diarrhea, with no associated abdominal pain. Patient also reports having subjective fevers at home, 3 episodes of nonbloody, nonbilious vomiting, also with no associated pain. Family also had food at the same resort but none of them are sick. He also reports having burning on urination since Monday with no increased frequency, flank tenderness, or hematuria.    In ED, /77mm Hg, , 104.6F. Labs on admission showed WBC 2.62, lactate 2.3. EKG showed sinus tachycardia. UA positive. CT abdomen showed no intraabdominal pathology.     Patient was examined bedside this morning. He reports a couple episodes of loose, nonbloody, and watery stools overnight and one this morning. He denies any abdominal pain, vomiting, blood on urination, or burning with urination today. He reports feeling better generally.      Past Medical Hx:   No pertinent past medical history    Past Sx:  No significant past surgical history    Allergies:  No Known Allergies    Current Meds:   Standng Meds:  cefTRIAXone   IVPB      cefTRIAXone   IVPB 2000 milliGRAM(s) IV Intermittent every 24 hours  enoxaparin Injectable 40 milliGRAM(s) SubCutaneous daily  influenza   Vaccine 0.5 milliLiter(s) IntraMuscular once  sodium chloride 0.9%. 1000 milliLiter(s) (75 mL/Hr) IV Continuous <Continuous>    PRN Meds:  None  HOME MEDICATIONS:  None    Vital Signs:   T(F): 99.4 (19 @ 06:04), Max: 99.4 (19 @ 06:04)  HR: 86 (19 @ 06:04) (86 - 99)  BP: 104/50 (19 @ 06:04) (104/50 - 155/83)  RR: 18 (19 @ 06:04) (18 - 18)  SpO2: --      Physical Exam:   GENERAL: Resting comfortably in no acute distress  HEENT: NCAT  CHEST/LUNG: CTAB  HEART: Regular rate and rhythm; s1 s2 appreciated  ABDOMEN: Soft, Nontender, Nondistended; Bowel sounds present  EXTREMITIES: No LE edema b/l  NERVOUS SYSTEM:  Alert & Oriented X3    LABS:                        12.3   11.08 )-----------( 137      ( 26 Dec 2019 06:44 )             38.4         140  |  105  |  11  ----------------------------<  103<H>  4.2   |  21  |  1.0    Ca    8.5      26 Dec 2019 06:44  Mg     2.4         TPro  5.7    /  Alb  3.6    /  TBili  2.1    /  DBili  x      /  AST  25     /  ALT  37     /  AlkPhos  60     25 Dec 2019 06:02    Urinalysis Basic - ( 24 Dec 2019 20:44 )    Color: Yellow / Appearance: Clear / S.021 / pH: x  Gluc: x / Ketone: Negative  / Bili: Negative / Urobili: <2 mg/dL   Blood: x / Protein: 30 mg/dL / Nitrite: Positive   Leuk Esterase: Moderate / RBC: 26 /HPF / WBC 26 /HPF   Sq Epi: x / Non Sq Epi: 0 /HPF / Bacteria: Many    Culture - Blood (collected 19 @ 19:29)  Source: .Blood Blood-Peripheral  Gram Stain (19 @ 01:11):    Growth in aerobic bottle: Gram Negative Rods    Growth in anaerobic bottle: Gram Negative Rods  Preliminary Report (19 @ 01:11):    Growth in aerobic bottle: Gram Negative Rods    Growth in anaerobic bottle: Gram Negative Rods    Culture - Blood (collected 19 @ 19:29)  Source: .Blood Blood-Peripheral  Gram Stain (19 @ 15:03):    Growth in aerobic bottle: Gram Negative Rods    Growth in anaerobic bottle: Gram Negative Rods  Preliminary Report (19 @ 15:03):    Growth in aerobic bottle: Gram Negative Rods    Growth in anaerobic bottle: Gram Negative Rods    "Due to technical problems, Proteus sp. will Not be reported as part of    the BCID panel until further notice"    ***Blood Panel PCR results on this specimenare available    approximately 3 hours after the Gram stain result.***    Gram stain, PCR, and/or culture results may not always    correspond due to difference in methodologies.    ************************************************************    This PCR assaywas performed using [employer].    The following targets are tested for: Enterococcus,    vancomycin resistant enterococci, Listeria monocytogenes,    coagulase negative staphylococci, S. aureus,    methicillin resistant S. aureus, Streptococcus agalactiae    (Group B), S. pneumoniae, S. pyogenes (Group A),    Acinetobacter baumannii, Enterobacter cloacae, E. coli,    Klebsiella oxytoca, K. pneumoniae, Proteus sp.,    Serratia marcescens, Haemophilus influenzae,    Neisseria meningitidis, Pseudomonas aeruginosa, Candida    albicans, C. glabrata, C krusei, C parapsilosis,    C. tropicalis and the KPC resistance gene.  Organism: Blood Culture PCR (19 @ 13:36)  Organism: Blood Culture PCR (19 @ 13:36)      -  Escherichia coli: Detec      Method Type: PCR

## 2019-12-26 NOTE — CONSULT NOTE ADULT - ASSESSMENT
39 M with fevers / diarrhea:     # Sepsis / E Coli bacteremia / diarrhea due to EPEC E. Coli   - Resolving   - C/W Rocephin for now.   - Repeat blood cx

## 2019-12-27 ENCOUNTER — TRANSCRIPTION ENCOUNTER (OUTPATIENT)
Age: 39
End: 2019-12-27

## 2019-12-27 LAB
-  AMIKACIN: SIGNIFICANT CHANGE UP
-  AMPICILLIN/SULBACTAM: SIGNIFICANT CHANGE UP
-  AMPICILLIN: SIGNIFICANT CHANGE UP
-  AZTREONAM: SIGNIFICANT CHANGE UP
-  CEFAZOLIN: SIGNIFICANT CHANGE UP
-  CEFEPIME: SIGNIFICANT CHANGE UP
-  CEFOXITIN: SIGNIFICANT CHANGE UP
-  CEFTRIAXONE: SIGNIFICANT CHANGE UP
-  CIPROFLOXACIN: SIGNIFICANT CHANGE UP
-  ERTAPENEM: SIGNIFICANT CHANGE UP
-  GENTAMICIN: SIGNIFICANT CHANGE UP
-  IMIPENEM: SIGNIFICANT CHANGE UP
-  LEVOFLOXACIN: SIGNIFICANT CHANGE UP
-  MEROPENEM: SIGNIFICANT CHANGE UP
-  PIPERACILLIN/TAZOBACTAM: SIGNIFICANT CHANGE UP
-  TOBRAMYCIN: SIGNIFICANT CHANGE UP
-  TRIMETHOPRIM/SULFAMETHOXAZOLE: SIGNIFICANT CHANGE UP
ALBUMIN SERPL ELPH-MCNC: 4.4 G/DL — SIGNIFICANT CHANGE UP (ref 3.5–5.2)
ALP SERPL-CCNC: 80 U/L — SIGNIFICANT CHANGE UP (ref 30–115)
ALT FLD-CCNC: 37 U/L — SIGNIFICANT CHANGE UP (ref 0–41)
ANION GAP SERPL CALC-SCNC: 13 MMOL/L — SIGNIFICANT CHANGE UP (ref 7–14)
AST SERPL-CCNC: 26 U/L — SIGNIFICANT CHANGE UP (ref 0–41)
BASOPHILS # BLD AUTO: 0.04 K/UL — SIGNIFICANT CHANGE UP (ref 0–0.2)
BASOPHILS NFR BLD AUTO: 0.7 % — SIGNIFICANT CHANGE UP (ref 0–1)
BILIRUB SERPL-MCNC: 0.6 MG/DL — SIGNIFICANT CHANGE UP (ref 0.2–1.2)
BUN SERPL-MCNC: 11 MG/DL — SIGNIFICANT CHANGE UP (ref 10–20)
CALCIUM SERPL-MCNC: 9.4 MG/DL — SIGNIFICANT CHANGE UP (ref 8.5–10.1)
CHLORIDE SERPL-SCNC: 105 MMOL/L — SIGNIFICANT CHANGE UP (ref 98–110)
CO2 SERPL-SCNC: 23 MMOL/L — SIGNIFICANT CHANGE UP (ref 17–32)
CREAT SERPL-MCNC: 0.8 MG/DL — SIGNIFICANT CHANGE UP (ref 0.7–1.5)
CULTURE RESULTS: SIGNIFICANT CHANGE UP
CULTURE RESULTS: SIGNIFICANT CHANGE UP
EOSINOPHIL # BLD AUTO: 0.25 K/UL — SIGNIFICANT CHANGE UP (ref 0–0.7)
EOSINOPHIL NFR BLD AUTO: 4.2 % — SIGNIFICANT CHANGE UP (ref 0–8)
GLUCOSE SERPL-MCNC: 92 MG/DL — SIGNIFICANT CHANGE UP (ref 70–99)
HCT VFR BLD CALC: 42.6 % — SIGNIFICANT CHANGE UP (ref 42–52)
HGB BLD-MCNC: 13.5 G/DL — LOW (ref 14–18)
IMM GRANULOCYTES NFR BLD AUTO: 0.5 % — HIGH (ref 0.1–0.3)
LYMPHOCYTES # BLD AUTO: 1.28 K/UL — SIGNIFICANT CHANGE UP (ref 1.2–3.4)
LYMPHOCYTES # BLD AUTO: 21.7 % — SIGNIFICANT CHANGE UP (ref 20.5–51.1)
MAGNESIUM SERPL-MCNC: 2.1 MG/DL — SIGNIFICANT CHANGE UP (ref 1.8–2.4)
MCHC RBC-ENTMCNC: 28.4 PG — SIGNIFICANT CHANGE UP (ref 27–31)
MCHC RBC-ENTMCNC: 31.7 G/DL — LOW (ref 32–37)
MCV RBC AUTO: 89.7 FL — SIGNIFICANT CHANGE UP (ref 80–94)
METHOD TYPE: SIGNIFICANT CHANGE UP
MONOCYTES # BLD AUTO: 0.44 K/UL — SIGNIFICANT CHANGE UP (ref 0.1–0.6)
MONOCYTES NFR BLD AUTO: 7.5 % — SIGNIFICANT CHANGE UP (ref 1.7–9.3)
NEUTROPHILS # BLD AUTO: 3.85 K/UL — SIGNIFICANT CHANGE UP (ref 1.4–6.5)
NEUTROPHILS NFR BLD AUTO: 65.4 % — SIGNIFICANT CHANGE UP (ref 42.2–75.2)
NRBC # BLD: 0 /100 WBCS — SIGNIFICANT CHANGE UP (ref 0–0)
ORGANISM # SPEC MICROSCOPIC CNT: SIGNIFICANT CHANGE UP
PLATELET # BLD AUTO: 189 K/UL — SIGNIFICANT CHANGE UP (ref 130–400)
POTASSIUM SERPL-MCNC: 4.5 MMOL/L — SIGNIFICANT CHANGE UP (ref 3.5–5)
POTASSIUM SERPL-SCNC: 4.5 MMOL/L — SIGNIFICANT CHANGE UP (ref 3.5–5)
PROT SERPL-MCNC: 7.1 G/DL — SIGNIFICANT CHANGE UP (ref 6–8)
RBC # BLD: 4.75 M/UL — SIGNIFICANT CHANGE UP (ref 4.7–6.1)
RBC # FLD: 12.8 % — SIGNIFICANT CHANGE UP (ref 11.5–14.5)
SODIUM SERPL-SCNC: 141 MMOL/L — SIGNIFICANT CHANGE UP (ref 135–146)
SPECIMEN SOURCE: SIGNIFICANT CHANGE UP
SPECIMEN SOURCE: SIGNIFICANT CHANGE UP
WBC # BLD: 5.89 K/UL — SIGNIFICANT CHANGE UP (ref 4.8–10.8)
WBC # FLD AUTO: 5.89 K/UL — SIGNIFICANT CHANGE UP (ref 4.8–10.8)

## 2019-12-27 PROCEDURE — 99233 SBSQ HOSP IP/OBS HIGH 50: CPT

## 2019-12-27 RX ADMIN — CEFTRIAXONE 100 MILLIGRAM(S): 500 INJECTION, POWDER, FOR SOLUTION INTRAMUSCULAR; INTRAVENOUS at 13:05

## 2019-12-27 RX ADMIN — ENOXAPARIN SODIUM 40 MILLIGRAM(S): 100 INJECTION SUBCUTANEOUS at 12:23

## 2019-12-27 RX ADMIN — SODIUM CHLORIDE 75 MILLILITER(S): 9 INJECTION INTRAMUSCULAR; INTRAVENOUS; SUBCUTANEOUS at 05:07

## 2019-12-27 RX ADMIN — Medication 600 MILLIGRAM(S): at 19:19

## 2019-12-27 RX ADMIN — CHLORHEXIDINE GLUCONATE 1 APPLICATION(S): 213 SOLUTION TOPICAL at 05:07

## 2019-12-27 NOTE — PROGRESS NOTE ADULT - SUBJECTIVE AND OBJECTIVE BOX
INTERVAL HPI/OVERNIGHT EVENTS:  No Known Allergies    cefTRIAXone   IVPB      cefTRIAXone   IVPB 2000 milliGRAM(s) IV Intermittent every 24 hours  chlorhexidine 4% Liquid 1 Application(s) Topical <User Schedule>  enoxaparin Injectable 40 milliGRAM(s) SubCutaneous daily  guaiFENesin  milliGRAM(s) Oral every 12 hours PRN  influenza   Vaccine 0.5 milliLiter(s) IntraMuscular once    cefTRIAXone   IVPB      cefTRIAXone   IVPB 2000 milliGRAM(s) IV Intermittent every 24 hours    Aox3, no diarrhea, abd soft, non tender  s1s2  lungs clear  oob in chair  Feels much better    Vital Signs Last 24 Hrs  T(C): 36.6 (27 Dec 2019 12:30), Max: 37 (26 Dec 2019 21:14)  T(F): 97.8 (27 Dec 2019 12:30), Max: 98.6 (26 Dec 2019 21:14)  HR: 77 (27 Dec 2019 12:30) (72 - 85)  BP: 136/74 (27 Dec 2019 12:30) (115/59 - 136/74)  BP(mean): --  RR: 18 (27 Dec 2019 12:30) (18 - 18)  SpO2: --    LABS:                        13.5   5.89  )-----------( 189      ( 27 Dec 2019 11:44 )             42.6     12-27    141  |  105  |  11  ----------------------------<  92  4.5   |  23  |  0.8    Ca    9.4      27 Dec 2019 11:44  Mg     2.1     12-27    TPro  7.1  /  Alb  4.4  /  TBili  0.6  /  DBili  x   /  AST  26  /  ALT  37  /  AlkPhos  80  12-27      GI PCR Panel, Stool (12.25.19 @ 12:21)    Specimen Source: .Stool Feces    Culture Results:   Enteropathogenic E. coli (EPEC)  DETECTED by PCR  *******Please Note:*******  GI panel PCR evaluates for:  Campylobacter, Plesiomonas shigelloides, Salmonella,  Vibrio, Yersinia enterocolitica, Enteroaggregative  Escherichia coli (EAEC), Enteropathogenic E.coli (EPEC),  Enterotoxigenic E. coli (ETEC) lt/st, Shiga-like  toxin-producing E. coli (STEC) stx1/stx2,  Shigella/ Enteroinvasive E. coli (EIEC), Cryptosporidium,  Cyclospora cayetanensis, Entamoeba histolytica,  Giardia lamblia, AdenovirusF 40/41, Astrovirus,  Norovirus GI/GII, Rotavirus A, Sapovirus    Culture - Urine (12.25.19 @ 00:20)    Specimen Source: .Urine None    Culture Results:   <10,000 CFU/mL Normal Urogenital Jennifer      Culture - Blood (12.24.19 @ 19:29)    Gram Stain:   Growth in aerobic bottle: Gram Negative Rods  Growth in anaerobic bottle: Gram Negative Rods    Specimen Source: .Blood Blood-Peripheral    Culture Results:   Growth in aerobic and anaerobic bottles: Escherichia coli  See previous culture [id number]    Culture - Blood (12.24.19 @ 19:29)    Gram Stain:   Growth in aerobic bottle: Gram Negative Rods  Growth in anaerobic bottle: Gram Negative Rods    -  Escherichia coli: Detec    -  Amikacin: S <=16    -  Ampicillin: R >16 These ampicillin results predict results for amoxicillin    -  Ampicillin/Sulbactam: I 16/8 Enterobacter, Citrobacter, and Serratia may develop resistance during prolonged therapy (3-4 days)    -  Aztreonam: S <=4    -  Cefazolin: S <=2 Enterobacter, Citrobacter, and Serratia may develop resistance during prolonged therapy (3-4 days)    -  Cefepime: S <=2    -  Cefoxitin: S <=8    -  Ceftriaxone: S <=1 Enterobacter, Citrobacter, and Serratia may develop resistance during prolonged therapy    -  Ciprofloxacin: S <=1    -  Ertapenem: S <=0.5    -  Gentamicin: S <=2    -  Imipenem: S <=1    -  Levofloxacin: S <=2    -  Meropenem: S <=1    -  Piperacillin/Tazobactam: S <=8    -  Tobramycin: S <=2    -  Trimethoprim/Sulfamethoxazole: S <=2/38    Specimen Source: .Blood Blood-Peripheral    Organism: Blood Culture PCR    Organism: Escherichia coli    Culture Results:   Growth in aerobic and anaerobic bottles: Escherichia coli  "Due to technical problems, Proteus sp. will Not be reported as part of  the BCID panel until further notice"  ***Blood Panel PCR results on this specimen are available  approximately 3 hours after the Gram stain result.***  Gram stain, PCR, and/or culture results may not always  correspond due to difference in methodologies.  ************************************************************  This PCR assay was performed using [employer].  The following targets are tested for: Enterococcus,  vancomycin resistant enterococci, Listeria monocytogenes,  coagulase negative staphylococci, S. aureus,  methicillin resistant S. aureus, Streptococcus agalactiae  (Group B), S. pneumoniae, S.pyogenes (Group A),  Acinetobacter baumannii, Enterobacter cloacae, E. coli,  Klebsiella oxytoca, K. pneumoniae, Proteus sp.,  Serratia marcescens, Haemophilus influenzae,  Neisseria meningitidis, Pseudomonas aeruginosa, Candida  albicans, C. glabrata, C krusei, C parapsilosis,  C. tropicalis and the KPC resistance gene.    Organism Identification: Blood Culture PCR  Escherichia coli    Method Type: PCR    Method Type: ELAIDA    CT scan report reviewed

## 2019-12-27 NOTE — PROGRESS NOTE ADULT - SUBJECTIVE AND OBJECTIVE BOX
Patient is a 39y old  Male who presents with a chief complaint of Diarrhea, fever (26 Dec 2019 19:18)    OVERNIGHT EVENTS: No acute events reported.     SUBJECTIVE / INTERVAL HPI: Patient is a 40 y/o M with no PMH presented to the hospital with chief complaint of fever, diarrhea since yesterday. Patient travelled to a resort in [place] with his family and came back yesterday. On Monday, he started having multiple episodes of nonbloody watery diarrhea. Tuesday he had about 20 episodes of nonbloody watery diarrhea, with no associated abdominal pain. Patient also reports having subjective fevers at home, 3 episodes of nonbloody, nonbilious vomiting, also with no associated pain. Family also had food at the same resort but none of them are sick. He also reports having burning on urination since Monday with no increased frequency, flank tenderness, or hematuria.    In ED, /77mm Hg, , 104.6F. Labs on admission showed WBC 2.62, lactate 2.3. EKG showed sinus tachycardia. UA positive. CT abdomen showed no intraabdominal pathology.     Patient was seen and examined bedside this morning. He reports continued episodes of loose, nonbloody, and watery stools overnight. He denies any abdominal pain, vomiting, blood on urination, or burning with urination today. He reports feeling better and wants to go home.     VITAL SIGNS:  Vital Signs Last 24 Hrs  T(C): 36.9 (27 Dec 2019 05:20), Max: 37.3 (26 Dec 2019 13:30)  T(F): 98.4 (27 Dec 2019 05:20), Max: 99.1 (26 Dec 2019 13:30)  HR: 72 (27 Dec 2019 05:20) (72 - 85)  BP: 122/71 (27 Dec 2019 05:20) (115/59 - 122/71)  BP(mean): --  RR: 18 (27 Dec 2019 05:20) (18 - 18)  SpO2: --    PHYSICAL EXAM:    General: Resting comfortably. No acute distress  HEENT: NC/AT; clear conjunctiva  Cardiovascular: +S1/S2; RRR  Respiratory: CTAB; no W/R/R  Gastrointestinal: soft, NT/ND; +BSx4  Extremities: No edema   Neurological: AAOx3    MEDICATIONS:  MEDICATIONS  (STANDING):  cefTRIAXone   IVPB      cefTRIAXone   IVPB 2000 milliGRAM(s) IV Intermittent every 24 hours  chlorhexidine 4% Liquid 1 Application(s) Topical <User Schedule>  enoxaparin Injectable 40 milliGRAM(s) SubCutaneous daily  influenza   Vaccine 0.5 milliLiter(s) IntraMuscular once  sodium chloride 0.9%. 1000 milliLiter(s) (75 mL/Hr) IV Continuous <Continuous>    MEDICATIONS  (PRN):  guaiFENesin  milliGRAM(s) Oral every 12 hours PRN Cough      ALLERGIES:  Allergies    No Known Allergies    Intolerances        LABS:                        12.3   11.08 )-----------( 137      ( 26 Dec 2019 06:44 )             38.4     12-26    140  |  105  |  11  ----------------------------<  103<H>  4.2   |  21  |  1.0    Ca    8.5      26 Dec 2019 06:44  Mg     2.4     12-26    TPro  6.2  /  Alb  3.6  /  TBili  0.6  /  DBili  x   /  AST  18  /  ALT  31  /  AlkPhos  60  12-26        CAPILLARY BLOOD GLUCOSE      POCT Blood Glucose.: 77 mg/dL (25 Dec 2019 17:06)      RADIOLOGY & ADDITIONAL TESTS: Reviewed.  < from: CT Abdomen and Pelvis w/ IV Cont (12.24.19 @ 21:47) >    IMPRESSION:     No CT evidence for acute intra-abdominal or pelvic pathology    < end of copied text >  < from: Xray Chest 2 Views PA/Lat (12.24.19 @ 20:55) >  Impression:      No radiographic evidenceof acute cardiopulmonary disease.    < end of copied text >

## 2019-12-27 NOTE — PROGRESS NOTE ADULT - ASSESSMENT
Patient is a 40 y/o M with no PMH presented to the hospital with 1 day history of fever, multiple episodes of nonbloody diarrhea, 3 episodes of non bloody vomiting. Patient travelled to a resort in [place] with his family and came back yesterday. In ED, vitals showed , fever of 104.6F. Labs on admission showed WBC 2.62, lactate 2.3. EKG showed sinus tachycardia. UA positive. CT abdomen showed no intraabdominal pathology.     ASSESSMENT AND PLAN:    #Sepsis with gram negative luz bacteremia / bacterial gastroenteritis / UTI     - WBC 2.62, lactate 2.3, tachycardic to 121, fever 104.6F on admission  - Currently diarrhea has improved and vomiting resolved, afebrile  - s/p 3l LR bolus.  - Urine culture revealed normal gracie    Blood cx >> Ecoli pan sensitive,     Awaiting repeat blood cx.   -CT abdomen & pelvis showed no acute intra abdominal pathology  - Given cipro and flagyl in the ER.  - Currently on Ceftriaxone 2000mg IV     Possible gastroenteritis  -Stool PCR detected EPEC    #Diet: Regular  #DVT ppx: lovenox  #GI ppx: not indicated  #Dispo: acute    #Progress Note Handoff  Pending (specify): Final  Blood cx / Stool cx   Family discussion: NA, d/w the patient at bedside.   Disposition: Home, possibly today.

## 2019-12-27 NOTE — DISCHARGE NOTE PROVIDER - NSDCCPCAREPLAN_GEN_ALL_CORE_FT
PRINCIPAL DISCHARGE DIAGNOSIS  Diagnosis: E coli bacteremia  Assessment and Plan of Treatment: PRINCIPAL DISCHARGE DIAGNOSIS  Diagnosis: E coli bacteremia  Assessment and Plan of Treatment: During this admission you were found to have a bacteria called E. Coli in your blood and stool. This is likely the cause of your diarrhea, fevers, and vomiting. For this you were given intravenous antibiotics in the hospital.   Please follow up with your primary doctor and infectious disease specialist outpatient. PRINCIPAL DISCHARGE DIAGNOSIS  Diagnosis: E coli bacteremia  Assessment and Plan of Treatment: During this admission you were found to have a bacteria called E. Coli in your blood and stool. This is likely the cause of your diarrhea, fevers, and vomiting. For this you were given intravenous antibiotics in the hospital. You will need to go home with Ciprofloxacin 500mg orally twice daily for 10 days.  Please follow up with your primary doctor and infectious disease specialist outpatient.

## 2019-12-27 NOTE — PROGRESS NOTE ADULT - ASSESSMENT
39 M with fevers / diarrhea:     # Sepsis / E Coli bacteremia / diarrhea due to EPEC E. Coli   - Resolving   - C/W Rocephin for today  -  Repeat blood cx - pending   - Will check blood cx. Likely will be negative as pt on appropriate abx.   - PO Ciprofloxacin 500 mg q12 for 10 days tomorrow.   - F/U PMD as outpt.

## 2019-12-27 NOTE — DISCHARGE NOTE PROVIDER - CARE PROVIDER_API CALL
Carmella Leon)  Infectious Disease; Internal Medicine  [address] [place] [address]  Phone: [phone]  Fax: [phone]  Follow Up Time:

## 2019-12-27 NOTE — PROGRESS NOTE ADULT - ASSESSMENT
· Assessment		  Patient is a 40 y/o M with no PMH presented to the hospital with 1 day history of fever, multiple episodes of nonbloody diarrhea, 3 episodes of non bloody vomiting. Patient travelled to a resort in [place] with his family and came back yesterday. In ED, vitals showed , fever of 104.6F. Labs on admission showed WBC 2.62, lactate 2.3. EKG showed sinus tachycardia. UA positive. CT abdomen showed no intraabdominal pathology.     #Sepsis with gram negative luz bacteremia possible 2/2 UTI  - Multiple episodes of diarrhea, 3 episodes of vomiting, fever, dysuria  - WBC 2.62, lactate 2.3, tachycardic to 121, fever 104.6F on admission  - Currently diarrhea has improved and vomiting resolved, afebrile  - s/p 3l LR bolus. Continuing NS @75cc/hr  - UA positive  - Urine culture revealed normal gracie  - CT abdomen & pelvis showed no acute intra abdominal pathology  - Given cipro and flagyl in the ER.  - On Rochephin 2g IV q12hrs  - Blood cx- 2/2 growing gram negative rods in aerobic bottles, one growing E Coli, antibiotic susceptibility pending  - Per ID, repeat blood culture today    Possible gastroenteritis  -Stool PCR detected EPEC  - CT A/P negative  - On Rochephin 2g IV q12hrs    #Diet: Regular  #DVT ppx: lovenox  #GI ppx: not indicated  #Dispo: acute · Assessment		  Patient is a 40 y/o M with no PMH presented to the hospital with 1 day history of fever, multiple episodes of nonbloody diarrhea, 3 episodes of non bloody vomiting. Patient travelled to a resort in [place] with his family and came back yesterday. In ED, vitals showed , fever of 104.6F. Labs on admission showed WBC 2.62, lactate 2.3. EKG showed sinus tachycardia. UA positive. CT abdomen showed no intraabdominal pathology.     #Sepsis with gram negative luz bacteremia possible 2/2 UTI  - Multiple episodes of diarrhea, 3 episodes of vomiting, fever, dysuria  - WBC 2.62, lactate 2.3, tachycardic to 121, fever 104.6F on admission  - Currently diarrhea has improved and vomiting resolved, afebrile  - s/p 3l LR bolus  - UA positive  - Urine culture revealed normal gracie  - CT abdomen & pelvis showed no acute intra abdominal pathology  - Given cipro and flagyl in the ER.  - On Rochephin 2g IV q12hrs  - Blood cx- 2/2 growing gram negative rods in aerobic bottles, one growing E Coli, antibiotic susceptibility pending  - Per ID, repeat blood culture drawn today  - will f/u with attending possibly ID, antibiotics and plan, possible dc home today    Possible gastroenteritis  -Stool PCR detected EPEC  - CT A/P negative  - On Rochephin 2g IV q12hrs  - will f/u with attending possibly ID, antibiotics and plan, possible dc home today    #Diet: Regular  #DVT ppx: lovenox  #GI ppx: not indicated  #Dispo: acute

## 2019-12-27 NOTE — DISCHARGE NOTE PROVIDER - HOSPITAL COURSE
Patient is a 40 y/o M with no PMH presented to the hospital with chief complaint of fever, diarrhea since day prior. Patient travelled to a resort in [place] with his family. At resort he started having multiple episodes of nonbloody watery diarrhea, about 20 episodes, associated fevers, chills, rigors and associated with lower abdominal pain. On drive back from resort, patient had to pull over for vomiting and fevers with rigors. Family also had food at the same resort but none of them are sick. He also reported having burning on urination with no increased frequency, flank tenderness, hematuria. In ED initial vitals were /77mm Hg, , 104.6F. Labs on admission showed WBC 2.62, lactate 2.3. EKG showed sinus tachycardia. UA positive. CT abdomen showed no intraabdominal pathology. Received rocephin and flagyl in ED and bolus fluids.    Patient was found to have a positive urinalysis, a GI PCR which grew E. Coli (EPEC), and 2 of 2 blood cultures growing E. Coli. On floors patient was afebrile and reported feeling much better on 2 grams Rocephin q24 hours. Repeat blood cultures taken.    He will be discharged with     He will be instructed to follow up with primary care and infectious disease outpatient for repeat blood culture results and follow up. Patient is a 40 y/o M with no PMH presented to the hospital with chief complaint of fever, diarrhea since day prior. Patient travelled to a resort in [place] with his family. At resort he started having multiple episodes of nonbloody watery diarrhea, about 20 episodes, associated fevers, chills, rigors and associated with lower abdominal pain. On drive back from resort, patient had to pull over for vomiting and fevers with rigors. Family also had food at the same resort but none of them are sick. He also reported having burning on urination with no increased frequency, flank tenderness, hematuria. In ED initial vitals were /77mm Hg, , 104.6F. Labs on admission showed WBC 2.62, lactate 2.3. EKG showed sinus tachycardia. UA positive. CT abdomen showed no intraabdominal pathology. Received rocephin and flagyl in ED and bolus fluids.    Patient was found to have a positive urinalysis, a GI PCR which grew E. Coli (EPEC), and 2 of 2 blood cultures growing E. Coli. On floors patient was afebrile and reported feeling much better on 2 grams Rocephin q24 hours. Repeat blood cultures taken.        He will be discharged with         He will be instructed to follow up with primary care and infectious disease outpatient for repeat blood culture results and follow up. Patient is a 40 y/o M with no PMH presented to the hospital with chief complaint of fever, diarrhea since day prior. Patient travelled to a resort in [place] with his family. At resort he started having multiple episodes of nonbloody watery diarrhea, about 20 episodes, associated fevers, chills, rigors and associated with lower abdominal pain. On drive back from resort, patient had to pull over for vomiting and fevers with rigors. Family also had food at the same resort but none of them are sick. He also reported having burning on urination with no increased frequency, flank tenderness, hematuria. In ED initial vitals were /77mm Hg, , 104.6F. Labs on admission showed WBC 2.62, lactate 2.3. EKG showed sinus tachycardia. UA positive. CT abdomen showed no intraabdominal pathology. Received rocephin and flagyl in ED and bolus fluids.    Patient was found to have a positive urinalysis, a GI PCR which grew E. Coli (EPEC), and 2 of 2 blood cultures growing E. Coli. On floors patient was afebrile and reported feeling much better on 2 grams Rocephin q24 hours. Repeat blood cultures taken.        He will be discharged with Ciprofloxacin 500mg q12hrs for 10 more days        He will be instructed to follow up with primary care and infectious disease outpatient for repeat blood culture results and follow up.

## 2019-12-27 NOTE — PROGRESS NOTE ADULT - SUBJECTIVE AND OBJECTIVE BOX
MADELINE KATZ  39y  Male      Patient is a 39y old  Male who presents with a chief complaint of Diarrhea, fever (27 Dec 2019 10:55)      INTERVAL HPI/OVERNIGHT EVENTS:      ******************************* REVIEW OF SYSTEMS:**********************************************  Feeling better.   All other review of systems negative    *********************** VITALS ******************************************    T(F): 98.4 (12-27-19 @ 05:20)  HR: 72 (12-27-19 @ 05:20) (72 - 85)  BP: 122/71 (12-27-19 @ 05:20) (115/59 - 122/71)  RR: 18 (12-27-19 @ 05:20) (18 - 18)  SpO2: --    12-26-19 @ 07:01  -  12-27-19 @ 07:00  --------------------------------------------------------  IN: 1000 mL / OUT: 0 mL / NET: 1000 mL            12-26-19 @ 07:01  -  12-27-19 @ 07:00  --------------------------------------------------------  IN: 1000 mL / OUT: 0 mL / NET: 1000 mL        ******************************** PHYSICAL EXAM:**************************************************  GENERAL: NAD    PSYCH: no agitation, baseline mentation  HEENT:     NERVOUS SYSTEM:  Alert & Oriented X3,    PULMONARY: JOHANN, CTA    CARDIOVASCULAR: S1S2 RRR    GI: Soft, NT, ND; BS present.    EXTREMITIES:  2+ Peripheral Pulses, No clubbing, cyanosis, or edema    LYMPH: No lymphadenopathy noted    SKIN: No rashes or lesions    ******************************************************************************************    Consultant(s) Notes Reviewed:  [x ] YES  [ ] NO        **************************** LABS *******************************************************                          12.3   11.08 )-----------( 137      ( 26 Dec 2019 06:44 )             38.4     12-26    140  |  105  |  11  ----------------------------<  103<H>  4.2   |  21  |  1.0    Ca    8.5      26 Dec 2019 06:44  Mg     2.4     12-26    TPro  6.2  /  Alb  3.6  /  TBili  0.6  /  DBili  x   /  AST  18  /  ALT  31  /  AlkPhos  60  12-26          Lactate Trend  12-25 @ 06:02 Lactate:1.6   12-24 @ 19:29 Lactate:2.3         CAPILLARY BLOOD GLUCOSE      POCT Blood Glucose.: 77 mg/dL (25 Dec 2019 17:06)          **************************Active Medications *******************************************  No Known Allergies      cefTRIAXone   IVPB      cefTRIAXone   IVPB 2000 milliGRAM(s) IV Intermittent every 24 hours  chlorhexidine 4% Liquid 1 Application(s) Topical <User Schedule>  enoxaparin Injectable 40 milliGRAM(s) SubCutaneous daily  guaiFENesin  milliGRAM(s) Oral every 12 hours PRN  influenza   Vaccine 0.5 milliLiter(s) IntraMuscular once  sodium chloride 0.9%. 1000 milliLiter(s) IV Continuous <Continuous>      ***************************************************  RADIOLOGY & ADDITIONAL TESTS:    Imaging Personally Reviewed:  [ ] YES  [ ] NO    HEALTH ISSUES - PROBLEM Dx:

## 2019-12-27 NOTE — DISCHARGE NOTE PROVIDER - NSDCFUADDAPPT_GEN_ALL_CORE_FT
Please follow up with Dr. Leon  [facility]  [address] [place] [address]  on Friday from 1 to 4 pm if needed  (she is no longer in [address] [place] [address])

## 2019-12-28 ENCOUNTER — TRANSCRIPTION ENCOUNTER (OUTPATIENT)
Age: 39
End: 2019-12-28

## 2019-12-28 VITALS
OXYGEN SATURATION: 96 % | TEMPERATURE: 98 F | RESPIRATION RATE: 16 BRPM | HEART RATE: 87 BPM | DIASTOLIC BLOOD PRESSURE: 62 MMHG | SYSTOLIC BLOOD PRESSURE: 130 MMHG

## 2019-12-28 LAB
ALBUMIN SERPL ELPH-MCNC: 4.1 G/DL — SIGNIFICANT CHANGE UP (ref 3.5–5.2)
ALP SERPL-CCNC: 88 U/L — SIGNIFICANT CHANGE UP (ref 30–115)
ALT FLD-CCNC: 52 U/L — HIGH (ref 0–41)
ANION GAP SERPL CALC-SCNC: 17 MMOL/L — HIGH (ref 7–14)
AST SERPL-CCNC: 37 U/L — SIGNIFICANT CHANGE UP (ref 0–41)
BASOPHILS # BLD AUTO: 0.08 K/UL — SIGNIFICANT CHANGE UP (ref 0–0.2)
BASOPHILS NFR BLD AUTO: 1.2 % — HIGH (ref 0–1)
BILIRUB SERPL-MCNC: 0.4 MG/DL — SIGNIFICANT CHANGE UP (ref 0.2–1.2)
BUN SERPL-MCNC: 14 MG/DL — SIGNIFICANT CHANGE UP (ref 10–20)
CALCIUM SERPL-MCNC: 9.6 MG/DL — SIGNIFICANT CHANGE UP (ref 8.5–10.1)
CHLORIDE SERPL-SCNC: 101 MMOL/L — SIGNIFICANT CHANGE UP (ref 98–110)
CO2 SERPL-SCNC: 22 MMOL/L — SIGNIFICANT CHANGE UP (ref 17–32)
CREAT SERPL-MCNC: 1 MG/DL — SIGNIFICANT CHANGE UP (ref 0.7–1.5)
EOSINOPHIL # BLD AUTO: 0.39 K/UL — SIGNIFICANT CHANGE UP (ref 0–0.7)
EOSINOPHIL NFR BLD AUTO: 5.7 % — SIGNIFICANT CHANGE UP (ref 0–8)
GLUCOSE SERPL-MCNC: 94 MG/DL — SIGNIFICANT CHANGE UP (ref 70–99)
HCT VFR BLD CALC: 45.2 % — SIGNIFICANT CHANGE UP (ref 42–52)
HGB BLD-MCNC: 14.4 G/DL — SIGNIFICANT CHANGE UP (ref 14–18)
IMM GRANULOCYTES NFR BLD AUTO: 0.9 % — HIGH (ref 0.1–0.3)
LYMPHOCYTES # BLD AUTO: 2.13 K/UL — SIGNIFICANT CHANGE UP (ref 1.2–3.4)
LYMPHOCYTES # BLD AUTO: 31.3 % — SIGNIFICANT CHANGE UP (ref 20.5–51.1)
MAGNESIUM SERPL-MCNC: 2.1 MG/DL — SIGNIFICANT CHANGE UP (ref 1.8–2.4)
MCHC RBC-ENTMCNC: 28 PG — SIGNIFICANT CHANGE UP (ref 27–31)
MCHC RBC-ENTMCNC: 31.9 G/DL — LOW (ref 32–37)
MCV RBC AUTO: 87.8 FL — SIGNIFICANT CHANGE UP (ref 80–94)
MONOCYTES # BLD AUTO: 0.7 K/UL — HIGH (ref 0.1–0.6)
MONOCYTES NFR BLD AUTO: 10.3 % — HIGH (ref 1.7–9.3)
NEUTROPHILS # BLD AUTO: 3.44 K/UL — SIGNIFICANT CHANGE UP (ref 1.4–6.5)
NEUTROPHILS NFR BLD AUTO: 50.6 % — SIGNIFICANT CHANGE UP (ref 42.2–75.2)
NRBC # BLD: 0 /100 WBCS — SIGNIFICANT CHANGE UP (ref 0–0)
PLATELET # BLD AUTO: 235 K/UL — SIGNIFICANT CHANGE UP (ref 130–400)
POTASSIUM SERPL-MCNC: 4.7 MMOL/L — SIGNIFICANT CHANGE UP (ref 3.5–5)
POTASSIUM SERPL-SCNC: 4.7 MMOL/L — SIGNIFICANT CHANGE UP (ref 3.5–5)
PROT SERPL-MCNC: 7.2 G/DL — SIGNIFICANT CHANGE UP (ref 6–8)
RBC # BLD: 5.15 M/UL — SIGNIFICANT CHANGE UP (ref 4.7–6.1)
RBC # FLD: 12.8 % — SIGNIFICANT CHANGE UP (ref 11.5–14.5)
SODIUM SERPL-SCNC: 140 MMOL/L — SIGNIFICANT CHANGE UP (ref 135–146)
WBC # BLD: 6.8 K/UL — SIGNIFICANT CHANGE UP (ref 4.8–10.8)
WBC # FLD AUTO: 6.8 K/UL — SIGNIFICANT CHANGE UP (ref 4.8–10.8)

## 2019-12-28 PROCEDURE — 99233 SBSQ HOSP IP/OBS HIGH 50: CPT

## 2019-12-28 RX ORDER — CEFTRIAXONE 500 MG/1
2000 INJECTION, POWDER, FOR SOLUTION INTRAMUSCULAR; INTRAVENOUS ONCE
Refills: 0 | Status: COMPLETED | OUTPATIENT
Start: 2019-12-28 | End: 2019-12-28

## 2019-12-28 RX ORDER — MOXIFLOXACIN HYDROCHLORIDE TABLETS, 400 MG 400 MG/1
1 TABLET, FILM COATED ORAL
Qty: 20 | Refills: 0
Start: 2019-12-28 | End: 2020-01-06

## 2019-12-28 RX ORDER — CIPROFLOXACIN LACTATE 400MG/40ML
500 VIAL (ML) INTRAVENOUS EVERY 12 HOURS
Refills: 0 | Status: DISCONTINUED | OUTPATIENT
Start: 2019-12-28 | End: 2019-12-28

## 2019-12-28 RX ADMIN — CEFTRIAXONE 100 MILLIGRAM(S): 500 INJECTION, POWDER, FOR SOLUTION INTRAMUSCULAR; INTRAVENOUS at 10:36

## 2019-12-28 RX ADMIN — Medication 600 MILLIGRAM(S): at 09:37

## 2019-12-28 RX ADMIN — CHLORHEXIDINE GLUCONATE 1 APPLICATION(S): 213 SOLUTION TOPICAL at 05:21

## 2019-12-28 NOTE — DISCHARGE NOTE NURSING/CASE MANAGEMENT/SOCIAL WORK - NSDCFUADDAPPT_GEN_ALL_CORE_FT
Please follow up with Dr. Leon  [facility]  [address] [place] [address]  on Friday from 1 to 4 pm if needed  (she is no longer in [address] [place] [address])

## 2019-12-28 NOTE — DISCHARGE NOTE NURSING/CASE MANAGEMENT/SOCIAL WORK - PATIENT PORTAL LINK FT
You can access the FollowMyHealth Patient Portal offered by [facility] by registering at the following website: [email or web address]. By joining [employer]s FollowMyHealth portal, you will also be able to view your health information using other applications (apps) compatible with our system.

## 2020-01-02 DIAGNOSIS — N39.0 URINARY TRACT INFECTION, SITE NOT SPECIFIED: ICD-10-CM

## 2020-01-02 DIAGNOSIS — K52.9 NONINFECTIVE GASTROENTERITIS AND COLITIS, UNSPECIFIED: ICD-10-CM

## 2020-01-02 DIAGNOSIS — A41.51 SEPSIS DUE TO ESCHERICHIA COLI [E. COLI]: ICD-10-CM

## 2020-01-02 DIAGNOSIS — A04.0 ENTEROPATHOGENIC ESCHERICHIA COLI INFECTION: ICD-10-CM

## 2020-01-02 LAB
CULTURE RESULTS: SIGNIFICANT CHANGE UP
SPECIMEN SOURCE: SIGNIFICANT CHANGE UP

## 2020-07-09 NOTE — PATIENT PROFILE ADULT - NSPROGENDIFFINTUB_GEN_A_NUR
Health Maintenance Due   Topic Date Due   • Shingles Vaccine (1 of 2) 11/10/2009   • Lung Cancer Screening  11/10/2014   • DTaP/Tdap/Td Vaccine (2 - Td) 05/20/2020   • Depression Screening  12/02/2020       Patient is due for topics as listed above but is not proceeding with Immunization(s) Dtap/Tdap/Td and Shingles and Lung Cancer Screening at this time.          never intubated